# Patient Record
Sex: MALE
[De-identification: names, ages, dates, MRNs, and addresses within clinical notes are randomized per-mention and may not be internally consistent; named-entity substitution may affect disease eponyms.]

---

## 2017-03-12 ENCOUNTER — HOSPITAL ENCOUNTER (INPATIENT)
Dept: HOSPITAL 5 - ED | Age: 27
LOS: 3 days | Discharge: HOME | DRG: 974 | End: 2017-03-15
Attending: INTERNAL MEDICINE | Admitting: INTERNAL MEDICINE
Payer: COMMERCIAL

## 2017-03-12 DIAGNOSIS — M62.82: ICD-10-CM

## 2017-03-12 DIAGNOSIS — T43.625A: ICD-10-CM

## 2017-03-12 DIAGNOSIS — E44.0: ICD-10-CM

## 2017-03-12 DIAGNOSIS — N17.0: ICD-10-CM

## 2017-03-12 DIAGNOSIS — E87.1: ICD-10-CM

## 2017-03-12 DIAGNOSIS — E87.6: ICD-10-CM

## 2017-03-12 DIAGNOSIS — Z71.51: ICD-10-CM

## 2017-03-12 DIAGNOSIS — B20: ICD-10-CM

## 2017-03-12 DIAGNOSIS — G93.41: Primary | ICD-10-CM

## 2017-03-12 DIAGNOSIS — F15.10: ICD-10-CM

## 2017-03-12 LAB
ALBUMIN SERPL-MCNC: 3.5 G/DL (ref 3.9–5)
ALBUMIN/GLOB SERPL: 0.6 %
ALP SERPL-CCNC: 52 UNITS/L (ref 35–129)
ALT SERPL-CCNC: 24 UNITS/L (ref 7–56)
ANION GAP SERPL CALC-SCNC: 24 MMOL/L
ANISOCYTOSIS BLD QL SMEAR: (no result)
APTT BLD: 26.8 SEC. (ref 24.2–36.6)
BILIRUB SERPL-MCNC: 0.8 MG/DL (ref 0.1–1.2)
BLASTOCYTES % (MANUAL): 0 %
BUN SERPL-MCNC: 64 MG/DL (ref 9–20)
BUN/CREAT SERPL: 26.66 %
CALCIUM SERPL-MCNC: 9.3 MG/DL (ref 8.4–10.2)
CHLORIDE SERPL-SCNC: 82.4 MMOL/L (ref 98–107)
CO2 SERPL-SCNC: 27 MMOL/L (ref 22–30)
GLUCOSE SERPL-MCNC: 121 MG/DL (ref 75–100)
HCT VFR BLD CALC: 46.8 % (ref 35.5–45.6)
HGB BLD-MCNC: 16.2 GM/DL (ref 11.8–15.2)
INR PPP: 0.92 (ref 0.87–1.13)
MCH RBC QN AUTO: 33 PG (ref 28–32)
MCHC RBC AUTO-ENTMCNC: 35 % (ref 32–34)
MCV RBC AUTO: 94 FL (ref 84–94)
PLATELET # BLD: 180 K/MM3 (ref 140–440)
POTASSIUM SERPL-SCNC: 3.5 MMOL/L (ref 3.6–5)
PROT SERPL-MCNC: 9.4 G/DL (ref 6.3–8.2)
RBC # BLD AUTO: 4.97 M/MM3 (ref 3.65–5.03)
SODIUM SERPL-SCNC: 130 MMOL/L (ref 137–145)
TOTAL CELLS COUNTED PERCENT: 29
WBC # BLD AUTO: 8.9 K/MM3 (ref 4.5–11)

## 2017-03-12 PROCEDURE — 84439 ASSAY OF FREE THYROXINE: CPT

## 2017-03-12 PROCEDURE — 36415 COLL VENOUS BLD VENIPUNCTURE: CPT

## 2017-03-12 PROCEDURE — 83735 ASSAY OF MAGNESIUM: CPT

## 2017-03-12 PROCEDURE — G0480 DRUG TEST DEF 1-7 CLASSES: HCPCS

## 2017-03-12 PROCEDURE — 80053 COMPREHEN METABOLIC PANEL: CPT

## 2017-03-12 PROCEDURE — 96360 HYDRATION IV INFUSION INIT: CPT

## 2017-03-12 PROCEDURE — 85027 COMPLETE CBC AUTOMATED: CPT

## 2017-03-12 PROCEDURE — 84100 ASSAY OF PHOSPHORUS: CPT

## 2017-03-12 PROCEDURE — 70450 CT HEAD/BRAIN W/O DYE: CPT

## 2017-03-12 PROCEDURE — 80048 BASIC METABOLIC PNL TOTAL CA: CPT

## 2017-03-12 PROCEDURE — 82140 ASSAY OF AMMONIA: CPT

## 2017-03-12 PROCEDURE — 82550 ASSAY OF CK (CPK): CPT

## 2017-03-12 PROCEDURE — 84484 ASSAY OF TROPONIN QUANT: CPT

## 2017-03-12 PROCEDURE — 85025 COMPLETE CBC W/AUTO DIFF WBC: CPT

## 2017-03-12 PROCEDURE — 80320 DRUG SCREEN QUANTALCOHOLS: CPT

## 2017-03-12 PROCEDURE — 84443 ASSAY THYROID STIM HORMONE: CPT

## 2017-03-12 PROCEDURE — 80051 ELECTROLYTE PANEL: CPT

## 2017-03-12 PROCEDURE — 85610 PROTHROMBIN TIME: CPT

## 2017-03-12 PROCEDURE — 85730 THROMBOPLASTIN TIME PARTIAL: CPT

## 2017-03-12 PROCEDURE — 85007 BL SMEAR W/DIFF WBC COUNT: CPT

## 2017-03-12 NOTE — ADMIT CRITERIA FORM
Admission Criteria Documentation: 








               MUSCULOSKELETAL DISEASE GRG





Clinical Indications for Admission to Inpatient Care





                                                            (Place 'X' for any 

and all applicable criteria):





Hospital admission is needed for appropriate care of the patient because of ANY 

ONE of the following:





[ ]I.    Fracture, dislocation, or other musculoskeletal injury requiring 

inpatient care(medical) as indicated 


         by ANY ONE of the following(4)(5)(6)(7)


         [ ]a)  Vertebral fracture requiring observation for instability or 

neurologic compromise (8)


         [ ]b)  Compartment syndrome (proven or cannot be ruled out during 

observation level of care) (9)


         [ ]c)  Limb-threatening injury


         [ ]d)  Major injury requiring inpatient stabilization such as traction 

initiation or external fixation 


                 before internal fixation or closure of complex or open fracture


         [ ]e)  Major injury requiring inpatient treatment after emergency or 

observation level care (as appropriate)


         [ ]f)   Severe pain requiring acute inpatient management


[ ]II.    Newly diagnosed or suspected bone, joint, or orthopedic device 

infection (e.g., osteomyelitis, septic arthritis) 


          needing ANY ONE of the following(1)(2)(3)


          [ ]a)   IV antibiotics that cannot be initiated in other than 

inpatient setting (e.g., patient too unstable or


                   home infusion not available)


          [ ]b)   Device removal or replacement


          [ ]c)   Bone or soft tissue debridement


          [ ]d)   Joint drainage (drain placement or repetitive aspirations)


[ ]III.    Severe rheumatologic disease (e.g., systemic lupus erythematosus, 

rheumatoid arthritis) with complications


          or comorbidities (Also use Optimal Recovery Care Criteria or General 

Recovery Criteria as appropriate on the 


          basis of predominant condition), including ANY ONE of the following(10

)(11)(12)(13)


          [ ]a)  Severe infection (e.g., CNS infection, sepsis) (14)


          [ ]b)  Respiratory complications, including ANY ONE of the following:


                  [ ]i)     Pleural effusion with respiratory compromise      


                  [ ]ii)    Pulmonary hypertension with congestive failure 


                  [ ]iii)   Respiratory failure


                  [ ]iv)   Pulmonary hemorrhage (15)


           [ ]c) Hematologic disease, including ANY ONE of the following:


                 [ ]i)     Coagulopathy with bleeding        


                 [ ]ii)    Thrombosis with hypercoagulable state      


                 [ ]iii)   Thrombotic thrombocytopenic purpura 


           [ ]d) Cerebritis with seizures, psychosis, or other severe 

abnormalities


           [ ]e) Vertebral destruction with monitoring needed for cervical 

myelopathy& possible respiratory compromise


           [ ]f)  Exacerbation that requires inpatient treatment (e.g., 

intravenous immunosuppression) (16)


           [ ]g) Acute renal failure


[ ]IV. Severe vasculitis with complications or comorbidities (Also use Optimal 

Recovery Care Criteria or


        General Recovery Criteria as appropriate on the basis of predominant 

condition), including


        ANY ONE of the following(11)(12)(17)(18)(19)(20)


        [ ]a)  CNS vasculitis with seizures, psychosis, or other severe 

abnormalities (22)


        [ ]b)  Renal failure (16)                                              

                             


        [ ]c)  Pulmonary hemorrhage (15)


        [ ]d)  Cerebral infarction                                             


        [ ]e)  Gastrointestinal ischemia 


        [ ]f)   Gangrene or threatened amputation


        [ ]g)  Exacerbation that requires inpatient treatment (e.g., 

intravenous immunosuppression) (19)(21)


[ ]V.  Severe myopathy as indicated by ANY ONE of the following (28)(29)


        [ ]a)  New onset of airway compromise or inability to swallow


        [ ]b)  Respiratory deterioration with observation needed for impending 

respiratory failure


        [ ]c)  Exacerbation that requires inpatient treatment (e.g., 

intravenous immunosuppression) 


[ ]VI. Severe gout (crystal arthropathy) as indicated by ANY ONE of the 

following (23)(24)


        [ ]a)  Severe pain requiring acute inpatient management


        [ ]b)  Exacerbation that requires inpatient treatment (e.g., 

intravenous treatment) 


[X ]VII.Rhabdomyolysis and ANY ONE of the following (25)(26)(27)


        [ X]a)  Acute renal failure


        [ ]b)  Need for intravenous hydration after emergency or observation 

level care (as appropriate)


        [ ]c)  Inability to maintain oral hydration


        [ ]d)  Change in mental status


        [ ]e)  Electrolyte abnormality that remains after emergency or 

observation level care (as appropriate) 


[ ]VIII Post amputation complication, as indicated by ANY ONE of the following 


         [ ]a) Infection


         [ ]b) Dehiscence


         [ ]c) Myodesis failure        


[ ]IX. Severe pain requiring acute inpatient management as indicated by ALL of 

the following (30)(31)(32)


        [ ]a)  Continuous or frequent (e.g., every 2 to 4 hrs) parenteral 

analgesics required [A]


        [ ]b)  Rapid improvement expected from treatment or acute intervention (

e.g., surgery, anesthesia procedure[B]


[ ]X.  Musculoskeletal Disease and ALL of the following:


        [ ]a)  Symptom or finding for which emergency and observation care have 

failed or are not considered 


                appropriate (Use General Criteria: Observation Care as 

appropriate)


        [ ]b)  Presence of ANY ONE of the following


               [ ]i)   A General Admission Criteria    


               [ ]ii)  A Pediatric General Admission Criteria 











The original [facility] content created by [facility] has been revised. 


The portions of the content which have been revised are identified through the 

use of italic text or in bold, and [facility] 


has neither reviewed nor approved the modified material. All other unmodified 

content is copyright  [facility].





Please see references footnoted in the original [facility] edition 

2016


Admission Criteria Met: Yes

## 2017-03-12 NOTE — EVENT NOTE
Date: 03/12/17





See H/p in reports


AMS-improving


Methamphetamine binge


Rhabdomyolysis


Acute Renal failure


Hyponatremia


Hypokalemia


Hiv/AIDS

## 2017-03-12 NOTE — EMERGENCY DEPARTMENT REPORT
HPI





- General


Chief Complaint: Altered Mental Status


Time Seen by Provider: 03/12/17 14:11





- HPI


HPI: 





 


Chief complaint: Altered mental status after a methamphetamine binge


HPI: Patient was brought in by his mother for not acting well after 

methamphetamine binge.  Last Saturday patient disappeared until Wednesday.  

During that time he was binging on amphetamines.  After returning home patient 

seemed to be normal on Wednesday but on Thursday became confused and then had 

nausea and vomiting.  On Friday patient is not feeling well and was not eating 

or drinking.  Patient seems confused initially but is gradually becoming more 

himself.  Patient is currently alert and oriented 3 and feels fatigued but is 

no longer having nausea vomiting.  Patient is not having any fever or diarrhea 

or cough or cold.


Mode of arrival:   private car


Source: Patient


Began: Approximately one week


Duration: See above


Context: See above.  Patient was admitted here in December and had a low CD4 

count and was treated for cryptosporidium which he has been treated for and he 

is no longer having diarrhea.  Patient states he has been taking his 

medications since December and has been gaining some weight.  Patient was being 

followed by Dr. Gordillo who left the local practice and is now being followed 

by Dr. francis.


Quality: Pain-free


Severity: 0  out of 10


Improved with: Nothing


Worsened with: Nothing


Associated signs and symptoms: See above





ED Past Medical Hx





- Past Medical History


Additional medical history: AIDS





- Social History


Smoking Status: Unknown if ever smoked


Substance Use Type: Methamphetamines





- Medications


Home Medications: 


 Home Medications











 Medication  Instructions  Recorded  Confirmed  Last Taken  Type


 


Elvitegr/Cobicist/Emtric/Tenof 1 each PO DAILY 12/04/16 12/04/16 Unknown History





[Stribild Tablet]     


 


Nitazoxanide [Alinia] 500 mg PO Q12H 12/04/16 12/04/16 Unknown History


 


Azithromycin [Zithromax TAB] 600 mg PO QDAY #4 tablet 12/09/16  Unknown Rx


 


Fluconazole [Diflucan TAB] 100 mg PO QDAY #7 tablet 12/09/16  Unknown Rx


 


Potassium Chloride 20 meq PO DAILY #7 tablet.er 12/09/16  Unknown Rx


 


Sulfamethoxazole/Trimethoprim 1 each PO DAILY #30 tablet 12/09/16  Unknown Rx





[Bactrim DS TAB]     














ED Review of Systems


ROS: 


Stated complaint: SOB/CAN'T TALK


Other details as noted in HPI


ROS





Constitutional: No fever 


ENT: No uri symptoms


Cardiovascular: No chest pain


Respiratory: No sob or cough


GI: No nausea vomiting or diarrhea


: No dysuria frequency or urgency, 


Skin: No rash


Neuro: No focal weakness or numbness


Psych: No depression


Hema/lymph: No edema





Physical Exam





- Physical Exam


Vital Signs: 


 Vital Signs











  03/12/17





  09:47


 


Temperature 98.4 F


 


Pulse Rate 94 H


 


Respiratory 18





Rate 


 


Blood Pressure 132/95


 


O2 Sat by Pulse 100





Oximetry 











Physical Exam: 





GENERAL: The patient is thin -American male in no acute distress.


HEENT: Normocephalic.  Atraumatic.  Extraocular motions are intact.  Patient 

has moist mucous membranes.


NECK: Supple.  No meningitic signs are noted.  There is no adenopathy noted.


CHEST/LUNGS: Clear to auscultation.  There is no respiratory distress noted.


HEART/CARDIOVASCULAR: Regular.  There is no tachycardia.  There is no gallop 

rub or murmur.


ABDOMEN: Abdomen is soft, nontender.  Patient has normal bowel sounds.  There 

is no abdominal distention.


SKIN: There is no rash.  There is no edema.  There is no diaphoresis.


NEURO: The patient is awake, alert, and oriented 3.  The patient is 

cooperative.  The patient has no focal neurologic deficits.  The patient has 

normal speech.


MUSCULOSKELETAL: There is no tenderness or deformity.  There is no limitation 

range of motion.  There is no evidence of acute injury.





ED Course


 Vital Signs











  03/12/17





  09:47


 


Temperature 98.4 F


 


Pulse Rate 94 H


 


Respiratory 18





Rate 


 


Blood Pressure 132/95


 


O2 Sat by Pulse 100





Oximetry 














- Reevaluation(s)


Reevaluation #1: 





03/12/17 14:50


Patient started on 2 L of normal saline for his acute renal failure and will be 

admitted to the hospitalist.





ED Medical Decision Making





- Lab Data


Result diagrams: 


 03/12/17 10:37





 03/12/17 10:37





 Laboratory Tests











  03/12/17 03/12/17 03/12/17





  10:37 10:37 10:37


 


PT  12.3  


 


INR  0.92  


 


APTT  26.8  


 


AST   92 H 


 


Total Creatine Kinase   7771 H 


 


Total Protein   9.4 H 


 


Albumin   3.5 L 


 


TSH    4.520 H


 


Salicylates   


 


Acetaminophen   


 


Plasma/Serum Alcohol   














  03/12/17 03/12/17 03/12/17





  10:37 10:37 10:37


 


PT   


 


INR   


 


APTT   


 


AST   


 


Total Creatine Kinase   


 


Total Protein   


 


Albumin   


 


TSH   


 


Salicylates  < 0.3 L  


 


Acetaminophen   < 15.0 


 


Plasma/Serum Alcohol    < 0.01














- Radiology Data


Radiology results: report reviewed (CT head shows no acute process.)


Critical care attestation.: 


If time is entered above; I have spent that time in minutes in the direct care 

of this critically ill patient, excluding procedure time.








ED Disposition


Clinical Impression: 


 Acute renal failure, Rhabdomyolysis





Disposition: OP ADMITTED AS IP TO THIS HOSP


Is pt being admited?: Yes


Does the pt Need Aspirin: No


Condition: Fair


Referrals: 


PRIMARY CARE,MD [Primary Care Provider] - 3-5 Days


Time of Disposition: 14:50 (admit to the hospitalist)

## 2017-03-12 NOTE — EMERGENCY DEPARTMENT REPORT
Chief Complaint: Altered Mental Status


Stated Complaint: SOB/CAN'T TALK





- HPI


History of Present Illness: 





26-year-old -American male with a past medical history of HIV and drug 

abuse comes in today for being nonverbal.  Mother reports the patient has been 

on a binge and he comes back home.  To her to be dehydrated and is non-verbal.  

Patient denies any pain.





- Exam


Vital Signs: 


 Vital Signs











  03/12/17





  09:47


 


Temperature 98.4 F


 


Pulse Rate 94 H


 


Respiratory 18





Rate 


 


Blood Pressure 132/95


 


O2 Sat by Pulse 100





Oximetry 











Physical Exam: 





GENERAL: Alert and oriented x3, no apparent distress, Normal Gait, atraumatic. 


HEAD: Head is normocephalic and a-traumatic. 


EYES: Extra ocular muscles are intact. Pupils are equal, round, and reactive to 

light and accommodation. 


 


MOUTH:Mouth is not well hydrated and with white thick exudate on tongue. 

Tonsils nonerythematous or swollen, Uvula midline, Tongue not elevated.. 

Posterior pharynx clear, no exudate or lesions. Patent airways. 


NECK: Supple. Non edematous, No lymphadenopathy or thyromegaly. 


LUNGS: Symetrical with respiration, No wheezing, no rales or crackles, CTAB. 


HEART: S1, S2 present, regular rate and rhythm without murmur, no rubs, no 

gallops. 


ABDOMEN: No organomegaly was noted,Positive bowel sounds, soft, and non-

distended. . Nontender to palpation on all Quadrants, NO CVA tenderness. 


NEUROLOGIC: No focal Deficit, Cranial nerves II through XII are grossly intact. 

No loss of sensation, No facial droop, non verbal 


PSYCHIATRIC: Mood is congruent with affect, 


SKIN: Warm and dry, No lesions, No ulceration or induration present





MSE screening note: 


Focused history and physical exam performed.


Due to findings the following was ordered:





Patient's been notified weighted by this provider and MSE.  Patient will be 

evaluated at [facility].  Altered mental status protocol ordered.





ED Disposition for MSE


Condition: Stable

## 2017-03-12 NOTE — CAT SCAN REPORT
FINAL REPORT



PROCEDURE:  CT HEAD/BRAIN WO CON



TECHNIQUE:  Computerized tomography of the head was performed

without contrast material. 



HISTORY:  ams elevated ck and JULIETTE 



COMPARISON:  No prior studies are available for comparison.



FINDINGS:  

Skull and scalp: Normal.



Paranasal sinuses: Trace fluid right mastoid. Mucosal thickening

left posterior maxillary sinus.



Ventricles and subarachnoid spaces: Normal.



Cerebrum: No evidence of hemorrhage, acute infarction or mass .



Cerebellum and brainstem: No evidence of hemorrhage, acute

infarction or mass.



Vasculature: Mildly dense intracranial arteries.



Comments: Cavum septum pellucidum.



IMPRESSION:  

No definite evidence of acute intracranial pathology. If symptoms

and or concern persists recommend MRI.

## 2017-03-13 LAB
ANION GAP SERPL CALC-SCNC: 14 MMOL/L
CHLORIDE SERPL-SCNC: 92 MMOL/L (ref 98–107)
CO2 SERPL-SCNC: 31 MMOL/L (ref 22–30)
MAGNESIUM SERPL-MCNC: 2.8 MG/DL (ref 1.7–2.3)
PHOSPHATE SERPL-MCNC: 2.8 MG/DL (ref 2.5–4.5)
POTASSIUM SERPL-SCNC: 3 MMOL/L (ref 3.6–5)
SODIUM SERPL-SCNC: 134 MMOL/L (ref 137–145)

## 2017-03-13 RX ADMIN — TENOFOVIR DISPROXIL FUMARATE SCH MG: 300 TABLET ORAL at 09:33

## 2017-03-13 RX ADMIN — SODIUM CHLORIDE SCH MLS/HR: 0.9 INJECTION, SOLUTION INTRAVENOUS at 23:25

## 2017-03-13 NOTE — HISTORY AND PHYSICAL REPORT
CHIEF COMPLAINT:

1.  Methamphetamine binge.

2.  Altered mental status.



HISTORY OF PRESENT ILLNESS:  A 26-year-old -American male with history of

HIV and AIDS, brought in by mother for altered mental status.  The patient was

on methamphetamine binge 1 week ago last Saturday, this appeared until

Wednesday.  During that time, the patient was apparently binging on

methamphetamines.  The patient was apparently normal on Wednesday, which is

03/08/2017, by around 03/09/2017, the patient became confused, and also had

nausea and vomiting.  On 03/10/2017, the patient was not feeling well and was

not eating or drinking.  The patient seems confused initially, but has been

gradually becoming more himself.  The patient is currently better now compared

to 3 days ago, but feels fatigued and no longer having nausea and vomiting, and

not having any fever or chills.  The patient was admitted here in December for

low CD4 count and was treated for cryptosporidium, was followed by Dr. Ruano

and Dr. Eng.  The patient currently under antiretrovirals.



PAST MEDICAL HISTORY:  Significant for AIDS and HIV.



SOCIAL HISTORY:  Methamphetamine use.



PAST SURGICAL HISTORY:  None.



FAMILY HISTORY:  Hypertension.



CURRENT MEDICATIONS:  Stribild tablets 1 tablet once a day, _____ 500 mg p.o.

q.i.d., Zithromax 600 mg q.i.d., fluconazole 100 mg p.o. daily, potassium

chloride 20 mEq daily, Bactrim 1 tablet p.o. daily.



REVIEW OF SYSTEMS:

CONSTITUTIONAL:  Altered sensorium 3 days ago, but now better, back to baseline.

 There was also vomiting or nausea.

HEENT:  No sore throat.  No postnasal drip.

CARDIOVASCULAR AND RESPIRATORY SYSTEM:  No shortness of breath, no chest pain,

no palpitations.

GASTROINTESTINAL:  No nausea, vomiting present until two days ago, today is

feeling better.

GENITOURINARY SYSTEM:  No dysuria, no flank pain.

MUSCULOSKELETAL SYSTEM:  No joint pains.  No muscle pains.

CENTRAL NERVOUS SYSTEM:  No syncope, no seizures.  Altered sensorium until two

days ago.

SKIN:  Normal.

A 14-point review of systems done and are negative.



PHYSICAL EXAMINATION:

GENERAL:  Young male, cooperative during examination.

VITAL SIGNS:  Temperature 98.4, pulse is 94, respiratory rate is 18, blood

pressure is 132/95, sats are 100%.

HEENT:  Unremarkable.  Tongue dry.

NECK:  Supple, no lymphadenopathy, no thyromegaly.

LUNGS:  Clear to auscultation and percussion.  Good air entry.

CARDIOVASCULAR:  S1, S2 heard.  No gallop, no murmur, no rub.  Apical impulse in

left fifth intercostal space and midclavicular line.

ABDOMEN:  Soft and benign.  No hepatosplenomegaly.  No guarding and no rigidity.

 Hernial orifices are normal.

EXTREMITIES:  Good pedal pulses.  No pedal edema.

CENTRAL NERVOUS SYSTEM:  Alert and oriented x4.

NEUROLOGIC:  Nonfocal exam.

SKIN:  Normal.



LABORATORY DATA:  Significant for H and H of 16.2 and 46.8 high.  Platelet count

is 180,000.  Sodium is 130, potassium is 3.5, BUN and creatinine 64 and 2.4,

creatinine kinase is 7771.  TSH is 4.52, total protein is 9.4, albumin 3.5, AST

is high at 92; toxicology screen is negative.  Amphetamine positive.  Head CT

was normal.



ASSESSMENT AND PLAN:

1.  Acute renal failure.  The patient was started on IV fluids.  Follow up BUN

and creatinine, Dr. Trevino, the Nephrology on-call was consulted.

2.  Rhabdomyolysis, moderate.  IV fluids for the time being.  Follow BUN and

creatinine.

3.  Methamphetamine danger.  Patient is on prolonged CIWA protocol.  The patient

is more alert and oriented x3 days ago.

4.  HIV/AIDS, continue antiretrovirals.

5.  Hyponatremia, should correct with IV fluids.  Her urine electrolytes

ordered.

6.  Hypokalemia, supplemented.

7.  Malnutrition moderate.  Dietary consult ordered.

8.  Transaminitis, ALT is normal.  AST is high.

9.  Deep venous thrombosis prophylaxis, Lovenox 40 mg subcutaneous daily.





DD: 03/13/2017 01:37

DT: 03/13/2017 02:26

JOB# [id number]  [id number]

VSM/NTS

## 2017-03-13 NOTE — PROGRESS NOTE
Assessment and Plan


Assessment and plan: 


Patient is a 26-year-old -American male with history of HIV/AIDS, 

Admitted with altered mental status after a week of methamphetamine binge and I'

ll return home had multiple episodes of nausea with vomiting.  History shows 

that he has had treatment for Cryptosporidium in December 2016 with noted low 

CD4 count of the time, he reports that he is on the care of and infectious 

diseases physician at this time..  On admission was noted to have 

rhabdomyolysis.








* Acute none traumatic rhabdomyolysis


* Hyponatremia


* Hypokalemia


* Substance abuse-amphetamine


* Acute kidney injury-likely secondary to visible today nephropathy present on 

admission


* AIDS





Plan


* Continue aggressive fluid resuscitation


* Monitor creatinine kinase


* Nephrology input noted and will continue to monitor renal function


* Continue HAART therapy


* Counselling provided to the patient on need to avoid substance abuse.  

Patient verbalized understanding


* DVT/GI PROPHY








History


Interval history: 


Altered mental status


Patient seen and examined this morning in no acute distress


Denies any chest pain, nausea, vomiting, diarrhea


No fever noted blood pressure controlled


No adverse events reported to me by nursing staff








Hospitalist Physical





- Physical exam


Narrative exam: 


 VITAL SIGNS:  Reviewed.    


GENERAL:  The patient appeared well nourished and normally developed. Vital 

signs as documented.


HEAD:  No signs of head trauma.


EYES:  Pupils are equal.  Extraocular motions intact.  


EARS:  Hearing grossly intact.


MOUTH:  Oropharynx is normal. 


NECK:  No adenopathy, no JVD.   


CHEST:  Chest with clear breath sounds bilaterally.  No wheezes, rales, or 

rhonchi.  


CARDIAC:  Regular rate and rhythm.  S1 and S2, without murmurs, gallops, or 

rubs.


VASCULAR:  No Edema.  Peripheral pulses normal and equal in all extremities.


ABDOMEN:  Soft, without detectable tenderness.  No sign of distention.  No   

rebound or guarding, and no masses palpated.   Bowel Sounds normal.


MUSCULOSKELETAL:  Good range of motion of all major joints. Extremities without 

clubbing, cyanosis or edema.  


NEUROLOGIC EXAM:  Alert and oriented x 3.  Still lethargic.  No focal sensory 

or strength deficits.   Speech still slow.  Follows commands.


PSYCHIATRIC:  Mood normal.


SKIN:  No rash or lesions.














- Constitutional


Vitals: 


 











Temp Pulse Resp BP Pulse Ox


 


 98.5 F   86   16   133/72   100 


 


 03/13/17 08:25  03/13/17 08:25  03/13/17 08:25  03/13/17 08:25  03/13/17 08:25














Results





- Labs


CBC & Chem 7: 


 03/12/17 10:37





 03/13/17 04:06


Labs: 


 Laboratory Last Values











WBC  8.9 K/mm3 (4.5-11.0)   03/12/17  10:37    


 


RBC  4.97 M/mm3 (3.65-5.03)   03/12/17  10:37    


 


Hgb  16.2 gm/dl (11.8-15.2)  H  03/12/17  10:37    


 


Hct  46.8 % (35.5-45.6)  H  03/12/17  10:37    


 


MCV  94 fl (84-94)   03/12/17  10:37    


 


MCH  33 pg (28-32)  H  03/12/17  10:37    


 


MCHC  35 % (32-34)  H  03/12/17  10:37    


 


RDW  12.3 % (13.2-15.2)  L  03/12/17  10:37    


 


Plt Count  180 K/mm3 (140-440)   03/12/17  10:37    


 


Mono % (Auto)  Np   03/12/17  10:37    


 


Add Manual Diff  Complete   03/12/17  10:37    


 


Total Counted  100   03/12/17  10:37    


 


Seg Neuts % (Manual)  58.0 % (40.0-70.0)   03/12/17  10:37    


 


Band Neutrophils %  0 %  03/12/17  10:37    


 


Lymphocytes % (Manual)  13.0 % (13.4-35.0)  L  03/12/17  10:37    


 


Reactive Lymphs % (Man)  0 %  03/12/17  10:37    


 


Monocytes % (Manual)  27.0 % (0.0-7.3)  H  03/12/17  10:37    


 


Eosinophils % (Manual)  2.0 % (0.0-4.3)   03/12/17  10:37    


 


Basophils % (Manual)  0 % (0.0-1.8)   03/12/17  10:37    


 


Metamyelocytes %  0 %  03/12/17  10:37    


 


Myelocytes %  0 %  03/12/17  10:37    


 


Promyelocytes %  0 %  03/12/17  10:37    


 


Blast Cells %  0 %  03/12/17  10:37    


 


Nucleated RBC %  Not Reportable   03/12/17  10:37    


 


Seg Neutrophils # Man  5.2 K/mm3 (1.8-7.7)   03/12/17  10:37    


 


Band Neutrophils #  0.0 K/mm3  03/12/17  10:37    


 


Lymphocytes # (Manual)  1.2 K/mm3 (1.2-5.4)   03/12/17  10:37    


 


Abs React Lymphs (Man)  0.0 K/mm3  03/12/17  10:37    


 


Monocytes # (Manual)  2.4 K/mm3 (0.0-0.8)  H  03/12/17  10:37    


 


Eosinophils # (Manual)  0.2 K/mm3 (0.0-0.4)   03/12/17  10:37    


 


Basophils # (Manual)  0.0 K/mm3 (0.0-0.1)   03/12/17  10:37    


 


Metamyelocytes #  0.0 K/mm3  03/12/17  10:37    


 


Myelocytes #  0.0 K/mm3  03/12/17  10:37    


 


Promyelocytes #  0.0 K/mm3  03/12/17  10:37    


 


Blast Cells #  0.0 K/mm3  03/12/17  10:37    


 


WBC Morphology  Not Reportable   03/12/17  10:37    


 


Hypersegmented Neuts  Not Reportable   03/12/17  10:37    


 


Hyposegmented Neuts  Not Reportable   03/12/17  10:37    


 


Hypogranular Neuts  Not Reportable   03/12/17  10:37    


 


Smudge Cells  Not Reportable   03/12/17  10:37    


 


Toxic Granulation  Not Reportable   03/12/17  10:37    


 


Toxic Vacuolation  Not Reportable   03/12/17  10:37    


 


Dohle Bodies  Not Reportable   03/12/17  10:37    


 


Pelger-Huet Anomaly  Not Reportable   03/12/17  10:37    


 


Carmine Rods  Not Reportable   03/12/17  10:37    


 


Platelet Estimate  Consistent w auto   03/12/17  10:37    


 


Clumped Platelets  Not Reportable   03/12/17  10:37    


 


Plt Clumps, EDTA  Not Reportable   03/12/17  10:37    


 


Large Platelets  Not Reportable   03/12/17  10:37    


 


Giant Platelets  Not Reportable   03/12/17  10:37    


 


Platelet Satelliting  Not Reportable   03/12/17  10:37    


 


Plt Morphology Comment  Not Reportable   03/12/17  10:37    


 


RBC Morphology  Not Reportable   03/12/17  10:37    


 


Dimorphic RBCs  Not Reportable   03/12/17  10:37    


 


Polychromasia  Not Reportable   03/12/17  10:37    


 


Hypochromasia  Not Reportable   03/12/17  10:37    


 


Poikilocytosis  Not Reportable   03/12/17  10:37    


 


Anisocytosis  1+   03/12/17  10:37    


 


Microcytosis  Not Reportable   03/12/17  10:37    


 


Macrocytosis  Not Reportable   03/12/17  10:37    


 


Spherocytes  Not Reportable   03/12/17  10:37    


 


Pappenheimer Bodies  Not Reportable   03/12/17  10:37    


 


Sickle Cells  Not Reportable   03/12/17  10:37    


 


Target Cells  Not Reportable   03/12/17  10:37    


 


Tear Drop Cells  Not Reportable   03/12/17  10:37    


 


Ovalocytes  Not Reportable   03/12/17  10:37    


 


Helmet Cells  Not Reportable   03/12/17  10:37    


 


[place] Bodies  Not Reportable   03/12/17  10:37    


 


Cabot Rings  Not Reportable   03/12/17  10:37    


 


[place] Cells  Not Reportable   03/12/17  10:37    


 


Bite Cells  Not Reportable   03/12/17  10:37    


 


Crenated Cell  Not Reportable   03/12/17  10:37    


 


Elliptocytes  Not Reportable   03/12/17  10:37    


 


Acanthocytes (Spur)  Not Reportable   03/12/17  10:37    


 


Rouleaux  Not Reportable   03/12/17  10:37    


 


Hemoglobin C Crystals  Not Reportable   03/12/17  10:37    


 


Schistocytes  Not Reportable   03/12/17  10:37    


 


Malaria parasites  Not Reportable   03/12/17  10:37    


 


Agusto Bodies  Not Reportable   03/12/17  10:37    


 


Hem Pathologist Commnt  No   03/12/17  10:37    


 


PT  12.3 Sec. (12.2-14.9)   03/12/17  10:37    


 


INR  0.92  (0.87-1.13)   03/12/17  10:37    


 


APTT  26.8 Sec. (24.2-36.6)   03/12/17  10:37    


 


Sodium  134 mmol/L (137-145)  L  03/13/17  04:06    


 


Potassium  3.0 mmol/L (3.6-5.0)  L  03/13/17  04:06    


 


Chloride  92.0 mmol/L ()  L  03/13/17  04:06    


 


Carbon Dioxide  31 mmol/L (22-30)  H  03/13/17  04:06    


 


Anion Gap  14 mmol/L  03/13/17  04:06    


 


BUN  64 mg/dL (9-20)  H  03/12/17  10:37    


 


Creatinine  2.4 mg/dL (0.8-1.5)  H  03/12/17  10:37    


 


Estimated GFR  33 ml/min  03/12/17  10:37    


 


BUN/Creatinine Ratio  26.66 %  03/12/17  10:37    


 


Glucose  121 mg/dL ()  H  03/12/17  10:37    


 


Lactic Acid  1.5 mmol/L (0.7-2.0)   03/12/17  10:37    


 


Calcium  9.3 mg/dL (8.4-10.2)   03/12/17  10:37    


 


Phosphorus  2.8 mg/dL (2.5-4.5)   03/13/17  04:06    


 


Magnesium  2.8 mg/dL (1.7-2.3)  H  03/13/17  04:06    


 


Total Bilirubin  0.8 mg/dL (0.1-1.2)   03/12/17  10:37    


 


AST  92 units/L (5-40)  H  03/12/17  10:37    


 


ALT  24 units/L (7-56)   03/12/17  10:37    


 


Alkaline Phosphatase  52 units/L ()   03/12/17  10:37    


 


Ammonia  35.0 umol/L (25-60)   03/12/17  10:37    


 


Total Creatine Kinase  8965 units/L ()  H  03/13/17  08:26    


 


Troponin T  0.010 ng/mL (0.00-0.029)   03/12/17  12:59    


 


Total Protein  9.4 g/dL (6.3-8.2)  H  03/12/17  10:37    


 


Albumin  3.5 g/dL (3.9-5)  L  03/12/17  10:37    


 


Albumin/Globulin Ratio  0.6 %  03/12/17  10:37    


 


TSH  4.520 mlU/mL (0.270-4.200)  H  03/12/17  10:37    


 


Free T4  1.41 ng/dL (0.76-1.46)   03/13/17  08:26    


 


Salicylates  < 0.3 mg/dL (2.8-20.0)  L  03/12/17  10:37    


 


Acetaminophen  < 15.0 ug/mL (10.0-30.0)   03/12/17  10:37    


 


Plasma/Serum Alcohol  < 0.01 gm% (0-0.07)   03/12/17  10:37    














- Imaging and Cardiology


CT Scan - head: image reviewed (no acute pathology)

## 2017-03-13 NOTE — CONSULTATION
History of Present Illness





- Reason for Consult


Consult date: 03/13/17


acute renal failure


Requesting physician: OSWALDO HARMNA





- History of Present Illness





26-year-old male with a history of AIDS





Medications and Allergies


 Allergies











Allergy/AdvReac Type Severity Reaction Status Date / Time


 


No Known Allergies Allergy   Unverified 12/04/16 14:17











 Home Medications











 Medication  Instructions  Recorded  Confirmed  Last Taken  Type


 


Darunavir/Cobicistat [Prezcobix 1 each PO DAILY 03/12/17 03/12/17 Unknown 

History





800 mg-150 mg Tablet]     


 


Dolutegravir Sodium [Tivicay] 50 mg PO DAILY 03/12/17 03/12/17 Unknown History


 


Tenofovir [Viread] 300 mg PO QDAY 03/12/17 03/12/17 Unknown History











Active Meds: 


Active Medications





Haloperidol Lactate (Haldol)  5 mg IM Q1H PRN


   PRN Reason: Unrespon. to mult. doses BZD's


Dextrose/Sodium Chloride (D5ns)  1,000 mls @ 125 mls/hr IV AS DIRECT [facility]


   Last Admin: 03/13/17 03:53 Dose:  125 mls/hr


Lorazepam (Ativan)  2 mg IV Q1H PRN


   PRN Reason: CIWA-Ar 8-15


Lorazepam (Ativan)  4 mg IV Q1H PRN


   PRN Reason: CIWA-Ar 16-25


Miscellaneous Medication (Dolutegravir)  50 mg PO DAILY [facility]


   Last Admin: 03/13/17 09:34 Dose:  50 mg


Miscellaneous Medication (Darunavir/Cobicistat [Prezcobix 800 Mg-150 Mg Tablet]

)  1 each PO DAILY [facility]


Tenofovir Disoproxil Fumarate (Viread)  300 mg PO QDAY [facility]


   Last Admin: 03/13/17 09:33 Dose:  300 mg











Exam





- Vital Signs


Vital signs: 


 Vital Signs











Temp Pulse Resp BP Pulse Ox


 


 98.4 F   94 H  18   132/95   100 


 


 03/12/17 09:47  03/12/17 09:47  03/12/17 09:47  03/12/17 09:47  03/12/17 09:47














Results





- Lab Results





 03/12/17 10:37





 03/13/17 04:06


 Most recent lab results











Calcium  9.3 mg/dL (8.4-10.2)   03/12/17  10:37    


 


Phosphorus  2.8 mg/dL (2.5-4.5)   03/13/17  04:06    


 


Magnesium  2.8 mg/dL (1.7-2.3)  H  03/13/17  04:06    














Assessment and Plan





- Patient Problems


(1) Hyponatremia


Current Visit: Yes   Status: Acute   





(2) Acute renal failure


Current Visit: Yes   Status: Acute   


Qualifiers: 


   Acute renal failure type: A 





(3) Rhabdomyolysis


Current Visit: Yes   Status: Acute   


Qualifiers: 


   Rhabdomyolysis type: R   Encounter type: E 





(4) AIDS


Current Visit: No   Status: Acute   





(5) Hypokalemia


Current Visit: No   Status: Acute

## 2017-03-14 LAB
ANION GAP SERPL CALC-SCNC: 15 MMOL/L
BUN SERPL-MCNC: 17 MG/DL (ref 9–20)
BUN/CREAT SERPL: 12.14 %
CALCIUM SERPL-MCNC: 8 MG/DL (ref 8.4–10.2)
CHLORIDE SERPL-SCNC: 101.5 MMOL/L (ref 98–107)
CO2 SERPL-SCNC: 25 MMOL/L (ref 22–30)
GLUCOSE SERPL-MCNC: 104 MG/DL (ref 75–100)
HCT VFR BLD CALC: 36.4 % (ref 35.5–45.6)
HGB BLD-MCNC: 12.5 GM/DL (ref 11.8–15.2)
MCH RBC QN AUTO: 32 PG (ref 28–32)
MCHC RBC AUTO-ENTMCNC: 34 % (ref 32–34)
MCV RBC AUTO: 94 FL (ref 84–94)
PLATELET # BLD: 205 K/MM3 (ref 140–440)
POTASSIUM SERPL-SCNC: 3.1 MMOL/L (ref 3.6–5)
RBC # BLD AUTO: 3.89 M/MM3 (ref 3.65–5.03)
SODIUM SERPL-SCNC: 138 MMOL/L (ref 137–145)
WBC # BLD AUTO: 8.2 K/MM3 (ref 4.5–11)

## 2017-03-14 RX ADMIN — SODIUM CHLORIDE SCH MLS/HR: 0.9 INJECTION, SOLUTION INTRAVENOUS at 04:12

## 2017-03-14 RX ADMIN — POTASSIUM CHLORIDE SCH MEQ: 1500 TABLET, EXTENDED RELEASE ORAL at 11:47

## 2017-03-14 RX ADMIN — TENOFOVIR DISPROXIL FUMARATE SCH MG: 300 TABLET ORAL at 09:42

## 2017-03-14 RX ADMIN — SODIUM CHLORIDE SCH MLS/HR: 0.9 INJECTION, SOLUTION INTRAVENOUS at 22:55

## 2017-03-14 RX ADMIN — POTASSIUM CHLORIDE SCH MEQ: 1500 TABLET, EXTENDED RELEASE ORAL at 09:42

## 2017-03-14 RX ADMIN — SODIUM CHLORIDE SCH MLS/HR: 0.9 INJECTION, SOLUTION INTRAVENOUS at 08:01

## 2017-03-14 NOTE — PROGRESS NOTE
Assessment and Plan


Assessment and plan: 


Patient is a 26-year-old -American male with history of HIV/AIDS, 

Admitted with altered mental status after a week of methamphetamine binge and I'

ll return home had multiple episodes of nausea with vomiting.  History shows 

that he has had treatment for Cryptosporidium in December 2016 with noted low 

CD4 count of the time, he reports that he is on the care of and infectious 

diseases physician at this time..  On admission was noted to have 

rhabdomyolysis.








* Acute none traumatic rhabdomyolysis


* Hyponatremia


* Hypokalemia


* Substance abuse-amphetamine


* Acute kidney injury-likely secondary to visible today nephropathy present on 

admission


* AIDS





Plan


* Renal function has improved CPK still elevated will continue fluids recheck 

CPK later today.  If improved will discharge patient's otherwise will await 

a.m. reports.  


* Monitor creatinine kinase


* Nephrology input noted 


* Continue HAART therapy


* Counselling provided to the patient on need to avoid substance abuse.  

Patient verbalized understanding


* DVT/GI PROPHY








History


Interval history: 


Altered mental status


Patient seen and examined this morning in no acute distress.  Denies any 

urinary abnormality


Denies any chest pain, nausea, vomiting, diarrhea


No fever noted blood pressure controlled


No adverse events reported to me by nursing staff








Hospitalist Physical





- Physical exam


Narrative exam: 


 VITAL SIGNS:  Reviewed.    


GENERAL:  The patient appeared well nourished and normally developed. Vital 

signs as documented.


HEAD:  No signs of head trauma.


EYES:  Pupils are equal.  Extraocular motions intact.  


EARS:  Hearing grossly intact.


MOUTH:  Oropharynx is normal. 


NECK:  No adenopathy, no JVD.   


CHEST:  Chest with clear breath sounds bilaterally.  No wheezes, rales, or 

rhonchi.  


CARDIAC:  Regular rate and rhythm.  S1 and S2, without murmurs, gallops, or 

rubs.


VASCULAR:  No Edema.  Peripheral pulses normal and equal in all extremities.


ABDOMEN:  Soft, without detectable tenderness.  No sign of distention.  No   

rebound or guarding, and no masses palpated.   Bowel Sounds normal.


MUSCULOSKELETAL:  Good range of motion of all major joints. Extremities without 

clubbing, cyanosis or edema.  


NEUROLOGIC EXAM:  Alert and oriented x 3.  Still lethargic.  No focal sensory 

or strength deficits.   Speech improved.  Follows commands.


PSYCHIATRIC:  Mood normal.


SKIN:  No rash or lesions.














- Constitutional


Vitals: 


 











Temp Pulse Resp BP Pulse Ox


 


 98.4 F   78   16   126/70   98 


 


 03/14/17 08:00  03/14/17 08:00  03/14/17 08:00  03/14/17 08:00  03/14/17 08:00














Results





- Labs


CBC & Chem 7: 


 03/14/17 05:44





 03/14/17 05:44


Labs: 


 Laboratory Last Values











WBC  8.2 K/mm3 (4.5-11.0)   03/14/17  05:44    


 


RBC  3.89 M/mm3 (3.65-5.03)   03/14/17  05:44    


 


Hgb  12.5 gm/dl (11.8-15.2)  D 03/14/17  05:44    


 


Hct  36.4 % (35.5-45.6)  D 03/14/17  05:44    


 


MCV  94 fl (84-94)   03/14/17  05:44    


 


MCH  32 pg (28-32)   03/14/17  05:44    


 


MCHC  34 % (32-34)   03/14/17  05:44    


 


RDW  12.2 % (13.2-15.2)  L  03/14/17  05:44    


 


Plt Count  205 K/mm3 (140-440)   03/14/17  05:44    


 


Mono % (Auto)  Np   03/12/17  10:37    


 


Add Manual Diff  Complete   03/12/17  10:37    


 


Total Counted  100   03/12/17  10:37    


 


Seg Neuts % (Manual)  58.0 % (40.0-70.0)   03/12/17  10:37    


 


Band Neutrophils %  0 %  03/12/17  10:37    


 


Lymphocytes % (Manual)  13.0 % (13.4-35.0)  L  03/12/17  10:37    


 


Reactive Lymphs % (Man)  0 %  03/12/17  10:37    


 


Monocytes % (Manual)  27.0 % (0.0-7.3)  H  03/12/17  10:37    


 


Eosinophils % (Manual)  2.0 % (0.0-4.3)   03/12/17  10:37    


 


Basophils % (Manual)  0 % (0.0-1.8)   03/12/17  10:37    


 


Metamyelocytes %  0 %  03/12/17  10:37    


 


Myelocytes %  0 %  03/12/17  10:37    


 


Promyelocytes %  0 %  03/12/17  10:37    


 


Blast Cells %  0 %  03/12/17  10:37    


 


Nucleated RBC %  Not Reportable   03/12/17  10:37    


 


Seg Neutrophils # Man  5.2 K/mm3 (1.8-7.7)   03/12/17  10:37    


 


Band Neutrophils #  0.0 K/mm3  03/12/17  10:37    


 


Lymphocytes # (Manual)  1.2 K/mm3 (1.2-5.4)   03/12/17  10:37    


 


Abs React Lymphs (Man)  0.0 K/mm3  03/12/17  10:37    


 


Monocytes # (Manual)  2.4 K/mm3 (0.0-0.8)  H  03/12/17  10:37    


 


Eosinophils # (Manual)  0.2 K/mm3 (0.0-0.4)   03/12/17  10:37    


 


Basophils # (Manual)  0.0 K/mm3 (0.0-0.1)   03/12/17  10:37    


 


Metamyelocytes #  0.0 K/mm3  03/12/17  10:37    


 


Myelocytes #  0.0 K/mm3  03/12/17  10:37    


 


Promyelocytes #  0.0 K/mm3  03/12/17  10:37    


 


Blast Cells #  0.0 K/mm3  03/12/17  10:37    


 


WBC Morphology  Not Reportable   03/12/17  10:37    


 


Hypersegmented Neuts  Not Reportable   03/12/17  10:37    


 


Hyposegmented Neuts  Not Reportable   03/12/17  10:37    


 


Hypogranular Neuts  Not Reportable   03/12/17  10:37    


 


Smudge Cells  Not Reportable   03/12/17  10:37    


 


Toxic Granulation  Not Reportable   03/12/17  10:37    


 


Toxic Vacuolation  Not Reportable   03/12/17  10:37    


 


Dohle Bodies  Not Reportable   03/12/17  10:37    


 


Pelger-Huet Anomaly  Not Reportable   03/12/17  10:37    


 


Carmine Rods  Not Reportable   03/12/17  10:37    


 


Platelet Estimate  Consistent w auto   03/12/17  10:37    


 


Clumped Platelets  Not Reportable   03/12/17  10:37    


 


Plt Clumps, EDTA  Not Reportable   03/12/17  10:37    


 


Large Platelets  Not Reportable   03/12/17  10:37    


 


Giant Platelets  Not Reportable   03/12/17  10:37    


 


Platelet Satelliting  Not Reportable   03/12/17  10:37    


 


Plt Morphology Comment  Not Reportable   03/12/17  10:37    


 


RBC Morphology  Not Reportable   03/12/17  10:37    


 


Dimorphic RBCs  Not Reportable   03/12/17  10:37    


 


Polychromasia  Not Reportable   03/12/17  10:37    


 


Hypochromasia  Not Reportable   03/12/17  10:37    


 


Poikilocytosis  Not Reportable   03/12/17  10:37    


 


Anisocytosis  1+   03/12/17  10:37    


 


Microcytosis  Not Reportable   03/12/17  10:37    


 


Macrocytosis  Not Reportable   03/12/17  10:37    


 


Spherocytes  Not Reportable   03/12/17  10:37    


 


Pappenheimer Bodies  Not Reportable   03/12/17  10:37    


 


Sickle Cells  Not Reportable   03/12/17  10:37    


 


Target Cells  Not Reportable   03/12/17  10:37    


 


Tear Drop Cells  Not Reportable   03/12/17  10:37    


 


Ovalocytes  Not Reportable   03/12/17  10:37    


 


Helmet Cells  Not Reportable   03/12/17  10:37    


 


[place] Bodies  Not Reportable   03/12/17  10:37    


 


Cabot Rings  Not Reportable   03/12/17  10:37    


 


[place] Cells  Not Reportable   03/12/17  10:37    


 


Bite Cells  Not Reportable   03/12/17  10:37    


 


Crenated Cell  Not Reportable   03/12/17  10:37    


 


Elliptocytes  Not Reportable   03/12/17  10:37    


 


Acanthocytes (Spur)  Not Reportable   03/12/17  10:37    


 


Rouleaux  Not Reportable   03/12/17  10:37    


 


Hemoglobin C Crystals  Not Reportable   03/12/17  10:37    


 


Schistocytes  Not Reportable   03/12/17  10:37    


 


Malaria parasites  Not Reportable   03/12/17  10:37    


 


Agusto Bodies  Not Reportable   03/12/17  10:37    


 


Hem Pathologist Commnt  No   03/12/17  10:37    


 


PT  12.3 Sec. (12.2-14.9)   03/12/17  10:37    


 


INR  0.92  (0.87-1.13)   03/12/17  10:37    


 


APTT  26.8 Sec. (24.2-36.6)   03/12/17  10:37    


 


Sodium  138 mmol/L (137-145)   03/14/17  05:44    


 


Potassium  3.1 mmol/L (3.6-5.0)  L  03/14/17  05:44    


 


Chloride  101.5 mmol/L ()   03/14/17  05:44    


 


Carbon Dioxide  25 mmol/L (22-30)   03/14/17  05:44    


 


Anion Gap  15 mmol/L  03/14/17  05:44    


 


BUN  17 mg/dL (9-20)   03/14/17  05:44    


 


Creatinine  1.4 mg/dL (0.8-1.5)   03/14/17  05:44    


 


Estimated GFR  > 60 ml/min  03/14/17  05:44    


 


BUN/Creatinine Ratio  12.14 %  03/14/17  05:44    


 


Glucose  104 mg/dL ()  H  03/14/17  05:44    


 


Lactic Acid  1.5 mmol/L (0.7-2.0)   03/12/17  10:37    


 


Calcium  8.0 mg/dL (8.4-10.2)  L  03/14/17  05:44    


 


Phosphorus  2.8 mg/dL (2.5-4.5)   03/13/17  04:06    


 


Magnesium  2.8 mg/dL (1.7-2.3)  H  03/13/17  04:06    


 


Total Bilirubin  0.8 mg/dL (0.1-1.2)   03/12/17  10:37    


 


AST  92 units/L (5-40)  H  03/12/17  10:37    


 


ALT  24 units/L (7-56)   03/12/17  10:37    


 


Alkaline Phosphatase  52 units/L ()   03/12/17  10:37    


 


Ammonia  35.0 umol/L (25-60)   03/12/17  10:37    


 


Total Creatine Kinase  8551 units/L ()  H  03/14/17  05:44    


 


Troponin T  0.010 ng/mL (0.00-0.029)   03/12/17  12:59    


 


Total Protein  9.4 g/dL (6.3-8.2)  H  03/12/17  10:37    


 


Albumin  3.5 g/dL (3.9-5)  L  03/12/17  10:37    


 


Albumin/Globulin Ratio  0.6 %  03/12/17  10:37    


 


TSH  4.520 mlU/mL (0.270-4.200)  H  03/12/17  10:37    


 


Free T4  1.41 ng/dL (0.76-1.46)   03/13/17  08:26    


 


Salicylates  < 0.3 mg/dL (2.8-20.0)  L  03/12/17  10:37    


 


Acetaminophen  < 15.0 ug/mL (10.0-30.0)   03/12/17  10:37    


 


Plasma/Serum Alcohol  < 0.01 gm% (0-0.07)   03/12/17  10:37

## 2017-03-14 NOTE — PROGRESS NOTE
Assessment and Plan





(1) Hyponatremia


Current Visit: Yes   Status: Acute   


improved with IVF, continue to monitor





(2) Acute renal failure


Current Visit: Yes   Status: Acute   


Qualifiers: 


   Acute renal failure type: A 


Improving, Cr 1.4 today, continue with IVF


Monitor renal fxn and electrolytes


Avoid nephrotoxins





(3) Rhabdomyolysis


Current Visit: Yes   Status: Acute   


Qualifiers: 


   Rhabdomyolysis type: R   Encounter type: E 


CK increased to 10,395, continue with IVF at 150 ml/hr, recheck labs tomorrow





(4) AIDS


Current Visit: No   Status: Acute  


deffered to ID  





(5) Hypokalemia


Current Visit: No   Status: Acute  


Marginally improved, replete K  








Subjective


Date of service: 03/14/17


Interval history: 





Pt seen lying in bed, NAD


reports nausea this am after breakfast





Objective





- Vital Signs


Vital signs: 


 Vital Signs - 12hr











  03/14/17





  08:00


 


Temperature 98.4 F


 


Pulse Rate [ 78





Right Radial] 


 


Respiratory 16





Rate 


 


Blood Pressure 126/70





[Right Arm] 


 


O2 Sat by Pulse 98





Oximetry 














- General Appearance


General appearance: well-developed, appears stated age


EENT: PERRL, mucous membranes moist, hearing intact


Neck: no JVD, no carotid bruit, supple


Respiratory: Present: Clear to Ascultation, Normal Exam


Cardiology: regular, normal heart rate, S1S2, no murmurs


Gastrointestinal: normal, normoactive bowel sounds


Integumentary: no rash, warm and dry


Neurologic: alert and oriented x3


Musculoskeletal: other (moves all extremitiex)


Psychiatric: cooperative





- Lab





 03/14/17 05:44





 03/14/17 05:44


 Most recent lab results











Calcium  8.0 mg/dL (8.4-10.2)  L  03/14/17  05:44    


 


Phosphorus  2.8 mg/dL (2.5-4.5)   03/13/17  04:06    


 


Magnesium  2.8 mg/dL (1.7-2.3)  H  03/13/17  04:06

## 2017-03-14 NOTE — EVENT NOTE
Date: 03/14/17





Patient seen and examined.  I reviewed above FNP notes which I agree with.  

Assessment and Plan discussed earlier. Continue volume repletion. Follow up CPK

## 2017-03-15 VITALS — DIASTOLIC BLOOD PRESSURE: 67 MMHG | SYSTOLIC BLOOD PRESSURE: 121 MMHG

## 2017-03-15 RX ADMIN — SODIUM CHLORIDE SCH MLS/HR: 0.9 INJECTION, SOLUTION INTRAVENOUS at 07:21

## 2017-03-15 RX ADMIN — TENOFOVIR DISPROXIL FUMARATE SCH MG: 300 TABLET ORAL at 09:44

## 2017-03-15 RX ADMIN — SODIUM CHLORIDE SCH MLS/HR: 0.9 INJECTION, SOLUTION INTRAVENOUS at 14:01

## 2017-03-15 RX ADMIN — SODIUM CHLORIDE SCH MLS/HR: 0.9 INJECTION, SOLUTION INTRAVENOUS at 03:30

## 2017-03-15 NOTE — DISCHARGE SUMMARY
Providers





- Providers


Date of Admission: 


03/12/17 14:51





Date of discharge: 03/15/17


Attending physician: 


VIVIANE WRIGHT MD





 





03/13/17 01:27


Consult to Physician [CONS] Routine 


   Consulting Provider: KWASI HUFFMAN


   Reason For Exam: Acute renal failure


   Place consult to:: renal


   Notified:: office


   Phone number called:: [phone]


   Was contact made?: Yes


   If yes, spoke with:: lupillo


   Time called:: 10:30


   Comment:: hodan on call











Primary care physician: 


PRIMARY CARE MD








Hospitalization


Reason for admission: altered mental status


Condition: Stable


Hospital course: 


Patient is a 26-year-old -American male with history of HIV/AIDS, 

Admitted with altered mental status after a week of methamphetamine binge and 

returning home had multiple episodes of nausea with vomiting.  History shows 

that he has had treatment for Cryptosporidium in December 2016 with noted low 

CD4 count of the time, he reports that he is on the care of and infectious 

diseases physician at this time.  On admission was noted to have 

rhabdomyolysis.  Patient was started on aggressive fluid resuscitation with 

initially worsening of the creatinine kinase which has since begun to improve 

today.  This muscle pain has since resolved.  He is clinically stable for 

discharge we did have extensive counseling on the need to avoid substance abuse 

patient verbalized understanding.  He understands the risk of continuing 

behavior.  He is to follow with nephrologist in a few days for repeat renal 

function study





Discharge diagnosis


* Acute none traumatic rhabdomyolysis


* Acute metabolic encephalopathy


* Hyponatremia


* Hypokalemia


* Substance abuse-amphetamine


* Acute kidney injury-likely secondary to visible today nephropathy present on 

admission


* AIDS





Disposition: DISCHARGED TO HOME OR SELFCARE


Time spent for discharge: 35 mins





Core Measure Documentation





- Palliative Care


Palliative Care/ Comfort Measures: Not Applicable





- Core Measures


Any of the following diagnoses?: none





- VTE Discharge Requirements


Deep Vein Thrombosis/Pulmonary Embolism Present on Admission: No





Exam





- Physical Exam


Narrative exam: 


 VITAL SIGNS:  Reviewed.    


GENERAL:  The patient appeared well nourished and normally developed. Vital 

signs as documented.


HEAD:  No signs of head trauma.


EYES:  Pupils are equal.  Extraocular motions intact.  


EARS:  Hearing grossly intact.


MOUTH:  Oropharynx is normal. 


NECK:  No adenopathy, no JVD.   


CHEST:  Chest with clear breath sounds bilaterally.  No wheezes, rales, or 

rhonchi.  


CARDIAC:  Regular rate and rhythm.  S1 and S2, without murmurs, gallops, or 

rubs.


VASCULAR:  No Edema.  Peripheral pulses normal and equal in all extremities.


ABDOMEN:  Soft, without detectable tenderness.  No sign of distention.  No   

rebound or guarding, and no masses palpated.   Bowel Sounds normal.


MUSCULOSKELETAL:  Good range of motion of all major joints. Extremities without 

clubbing, cyanosis or edema.  


NEUROLOGIC EXAM:  Alert and oriented x 3.  Still lethargic.  No focal sensory 

or strength deficits.   Speech improved.  Follows commands.


PSYCHIATRIC:  Mood normal.


SKIN:  No rash or lesions.














- Constitutional


Vitals: 


 











Temp Pulse Resp BP Pulse Ox


 


 99.3 F   88   20   131/77   99 


 


 03/15/17 00:00  03/15/17 00:00  03/15/17 00:00  03/15/17 00:00  03/14/17 20:00














Plan


Activity: advance as tolerated, fall precautions


Diet: regular


Special Instructions: smoking cessation, other (must avoid substance abuse)


Additional Instructions: Patient should follow with Nephrologist in 2 days for 

repeat renal function study


Follow up with: 


PRIMARY CARE,MD [Primary Care Provider] - 3-5 Days


LACY CARRENO MD [Staff Physician] - 7 Days

## 2017-03-15 NOTE — PROGRESS NOTE
Assessment and Plan





(1) Hyponatremia


Current Visit: Yes   Status: Acute   


improved with IVF, continue to monitor





(2) Acute renal failure


Current Visit: Yes   Status: Acute   


Qualifiers: 


   Acute renal failure type: A 


Improving


Avoid nephrotoxins


OK to d/c from renal standpoint





(3) Rhabdomyolysis


Current Visit: Yes   Status: Acute   


Qualifiers: 


   Rhabdomyolysis type: R   Encounter type: E 


CPK improved today, OK to d/c from renal standpoint





(4) AIDS


Current Visit: No   Status: Acute  


deffered to ID  





(5) Hypokalemia


Current Visit: No   Status: Acute  


Marginally improved, replete K  








Subjective


Date of service: 03/15/17


Interval history: 





Pt seen lying in bed, NAD








Objective





- Exam


Narrative Exam: 








General appearance: well-developed, appears stated age


EENT: PERRL, mucous membranes moist, hearing intact


Neck: no JVD, no carotid bruit, supple


Respiratory: Present: Clear to Ascultation, Normal Exam


Cardiology: regular, normal heart rate, S1S2, no murmurs


Gastrointestinal: normal, normoactive bowel sounds


Integumentary: no rash, warm and dry


Neurologic: alert and oriented x3


Musculoskeletal: other (moves all extremitiex)


Psychiatric: cooperative








- Vital Signs


Vital signs: 


 Vital Signs - 12hr











  03/15/17 03/15/17





  08:29 12:18


 


Temperature 99.3 F 100.7 F H


 


Pulse Rate [ 80 99 H





Right Radial]  


 


Respiratory 16 16





Rate  


 


Blood Pressure 127/72 130/67





[Right Arm]  


 


O2 Sat by Pulse 99 





Oximetry  














- Lab





 03/14/17 05:44





 03/14/17 05:44


 Most recent lab results











Calcium  8.0 mg/dL (8.4-10.2)  L  03/14/17  05:44    


 


Phosphorus  2.8 mg/dL (2.5-4.5)   03/13/17  04:06    


 


Magnesium  2.8 mg/dL (1.7-2.3)  H  03/13/17  04:06

## 2017-03-15 NOTE — EVENT NOTE
Date: 03/15/17





Repeat creatinine phosphokinase.  If continues to improve, okay to discharge 

this afternoon.  Encourage liberal oral fluid intake on discharge

## 2017-03-20 NOTE — QUERY- GENERAL
Reva Ferro________________   Date:_03/20/17____________    



/CDS:Carl Wright/Germaine_____________  Phone#:_03/20/17__________



Exercise your independent professional judgment when responding to this query. 

Questions asked do not imply a particular answer is desired or expected. We 
greatly 

appreciate your clarification on this issue.           

Clinical Documentation States:

Patient is a 26-year-old -American male with history of HIV/AIDS, 
Admitted with altered mental status after a week of methamphetamine binge and I'
ll return home had multiple episodes of nausea with vomiting.

     





Clinical Findings Show (include reference to source document):









Given the above clinical scenario can you please provide an appropriate 
diagnosis

based on your knowledge of the patient:

PHYSICIAN RESPONSE: 

Methamphetamine Binge with Altered Mental Status:

[  ] Poisoning

[ x ] Adverse effect of drug

[  ] Other_________

[  ] Unable to determine









Present on Admission:    [x ] Yes (Y)       [ ] Clinically undeterminable (W)  
    [ ]No(N)  





Please also document response in your Progress Notes and/or Discharge Summary 
and 

indicate if the condition   was present on admission.













LIZZD

## 2019-02-17 ENCOUNTER — HOSPITAL ENCOUNTER (INPATIENT)
Dept: HOSPITAL 5 - ED | Age: 29
LOS: 9 days | Discharge: HOME | DRG: 976 | End: 2019-02-26
Attending: INTERNAL MEDICINE | Admitting: INTERNAL MEDICINE
Payer: COMMERCIAL

## 2019-02-17 DIAGNOSIS — Z82.49: ICD-10-CM

## 2019-02-17 DIAGNOSIS — N43.3: ICD-10-CM

## 2019-02-17 DIAGNOSIS — N45.1: ICD-10-CM

## 2019-02-17 DIAGNOSIS — A41.9: Primary | ICD-10-CM

## 2019-02-17 DIAGNOSIS — B20: ICD-10-CM

## 2019-02-17 LAB
BASOPHILS # (AUTO): 0 K/MM3 (ref 0–0.1)
BASOPHILS NFR BLD AUTO: 0.2 % (ref 0–1.8)
BILIRUB UR QL STRIP: (no result)
BLOOD UR QL VISUAL: (no result)
BUN SERPL-MCNC: 8 MG/DL (ref 9–20)
BUN/CREAT SERPL: 7 %
CALCIUM SERPL-MCNC: 8.7 MG/DL (ref 8.4–10.2)
EOSINOPHIL # BLD AUTO: 0 K/MM3 (ref 0–0.4)
EOSINOPHIL NFR BLD AUTO: 0 % (ref 0–4.3)
HCT VFR BLD CALC: 42 % (ref 35.5–45.6)
HEMOLYSIS INDEX: 4
HGB BLD-MCNC: 14.8 GM/DL (ref 11.8–15.2)
LYMPHOCYTES # BLD AUTO: 1.1 K/MM3 (ref 1.2–5.4)
LYMPHOCYTES NFR BLD AUTO: 14.6 % (ref 13.4–35)
MCHC RBC AUTO-ENTMCNC: 35 % (ref 32–34)
MCV RBC AUTO: 90 FL (ref 84–94)
MONOCYTES # (AUTO): 0.8 K/MM3 (ref 0–0.8)
MONOCYTES % (AUTO): 10.3 % (ref 0–7.3)
PH UR STRIP: 5 [PH] (ref 5–7)
PLATELET # BLD: 228 K/MM3 (ref 140–440)
RBC # BLD AUTO: 4.68 M/MM3 (ref 3.65–5.03)
RBC #/AREA URNS HPF: 3 /HPF (ref 0–6)
UROBILINOGEN UR-MCNC: < 2 MG/DL (ref ?–2)
WBC #/AREA URNS HPF: 24 /HPF (ref 0–6)

## 2019-02-17 PROCEDURE — 86593 SYPHILIS TEST NON-TREP QUANT: CPT

## 2019-02-17 PROCEDURE — 80048 BASIC METABOLIC PNL TOTAL CA: CPT

## 2019-02-17 PROCEDURE — 85007 BL SMEAR W/DIFF WBC COUNT: CPT

## 2019-02-17 PROCEDURE — 82962 GLUCOSE BLOOD TEST: CPT

## 2019-02-17 PROCEDURE — 87040 BLOOD CULTURE FOR BACTERIA: CPT

## 2019-02-17 PROCEDURE — 86592 SYPHILIS TEST NON-TREP QUAL: CPT

## 2019-02-17 PROCEDURE — 86780 TREPONEMA PALLIDUM: CPT

## 2019-02-17 PROCEDURE — 82140 ASSAY OF AMMONIA: CPT

## 2019-02-17 PROCEDURE — 36415 COLL VENOUS BLD VENIPUNCTURE: CPT

## 2019-02-17 PROCEDURE — 85025 COMPLETE CBC W/AUTO DIFF WBC: CPT

## 2019-02-17 PROCEDURE — 87591 N.GONORRHOEAE DNA AMP PROB: CPT

## 2019-02-17 PROCEDURE — 93975 VASCULAR STUDY: CPT

## 2019-02-17 PROCEDURE — 87086 URINE CULTURE/COLONY COUNT: CPT

## 2019-02-17 PROCEDURE — 80202 ASSAY OF VANCOMYCIN: CPT

## 2019-02-17 PROCEDURE — 81001 URINALYSIS AUTO W/SCOPE: CPT

## 2019-02-17 NOTE — ULTRASOUND REPORT
FINAL REPORT



PROCEDURE:  US TESTICULAR DOPPLER COMP



TECHNIQUE:  Real-time gray-scale and color flow Doppler sonography in multiple planes of the scrotum,
 testicles, and epididymes was performed. Velocity spectral waveform analysis Doppler imaging of the 
arterial inflow and venous outflow of the testicles was performed with image documentation. CPT 24872
 and 37082







HISTORY:  left testicular pain and swelling 



COMPARISON:  No prior studies are available for comparison.



FINDINGS:  

RIGHT TESTICLE:  



Size: 4.5 x 4.0 x 2.1 cm .



Appearance: Normal size and echotexture .



Arterial blood flow: Normal spectral waveforms, flow velocities and color flow images..



Venous blood flow: Normal spectral waveforms and color flow images.



Right epididymis: Normal size and echotexture .



Hydrocele: None .



LEFT TESTICLE



Size: 4.3 x 4.3 x 2.6 cm .



Appearance: Normal size and echotexture .



Arterial blood flow: Normal spectral waveforms, flow velocities and color flow images..



Venous blood flow: Normal spectral waveforms and color flow images.



Leftepididymis: Enlarged measuring 2.3 cm. There is increased blood flow.



Hydrocele: Small hydrocele.







IMPRESSION:  

There is no intratesticular mass. There is normal blood flow to the testicles.



Left epididymitis. Small left hydrocele

## 2019-02-17 NOTE — EMERGENCY DEPARTMENT REPORT
ED Male  HPI





- General


Chief complaint: Urogenital-Male


Stated complaint: TESTICULAR PAIN


Time Seen by Provider: 02/17/19 19:47


Source: patient


Mode of arrival: Ambulatory


Limitations: No Limitations





- History of Present Illness


Initial comments: 





28-year-old male presents to ED with left testicular pain 5 days.  Patient 

states he began to experience pain 5 days ago.  It then went away for one day, 

but returned.  Patient reports constant pain for the last 3 days.  Patient 

reports onset at rest.  Denies any trauma to his groin area.  Pt has history of 

HIV, seen at the [facility].  States most recent CD4 count around 160-180.


MD Complaint: testicle pain, testicle swelling


-: days(s) (5)


Location: left testicle


Radiation: other (LLQ abdomen)


Severity: severe


Quality: aching


Consistency: constant


Improves with: none


Worsens with: palpation


fever, nausea/vomiting.  denies: discharge, dysuria





- Related Data


                                Home Medications











 Medication  Instructions  Recorded  Confirmed  Last Taken


 


Darunavir/Cobicistat (Nf) 1 each PO DAILY 03/12/17 03/12/17 Unknown





[Prezcobix 800 mg-150 mg (Nf)]    


 


Dolutegravir Sodium [Tivicay] 50 mg PO DAILY 03/12/17 03/12/17 Unknown


 


Tenofovir [Viread] 300 mg PO QDAY 03/12/17 03/12/17 Unknown











                                    Allergies











Allergy/AdvReac Type Severity Reaction Status Date / Time


 


No Known Allergies Allergy   Unverified 12/04/16 14:17














ED Review of Systems


ROS: 


Stated complaint: TESTICULAR PAIN


Other details as noted in HPI





Comment: All other systems reviewed and negative


Constitutional: fever


Gastrointestinal: abdominal pain, nausea.  denies: vomiting


Genitourinary: frequency, testicular pain.  denies: dysuria, discharge





ED Past Medical Hx





- Past Medical History


Hx Congestive Heart Failure: No


Hx Diabetes: No


Hx Asthma: No


Hx COPD: No


Hx HIV: Yes


Additional medical history: AIDS, Acute Renal Failure





- Surgical History


Past Surgical History?: No





- Social History


Smoking Status: Never Smoker


Substance Use Type: None





- Medications


Home Medications: 


                                Home Medications











 Medication  Instructions  Recorded  Confirmed  Last Taken  Type


 


Darunavir/Cobicistat (Nf) 1 each PO DAILY 03/12/17 03/12/17 Unknown History





[Prezcobix 800 mg-150 mg (Nf)]     


 


Dolutegravir Sodium [Tivicay] 50 mg PO DAILY 03/12/17 03/12/17 Unknown History


 


Tenofovir [Viread] 300 mg PO QDAY 03/12/17 03/12/17 Unknown History














ED Physical Exam





- General


Limitations: No Limitations


General appearance: alert, in no apparent distress





- Head


Head exam: Present: atraumatic, normocephalic





- Eye


Eye exam: Present: normal appearance





- ENT


ENT exam: Present: mucous membranes moist





- Neck


Neck exam: Present: normal inspection





- Respiratory


Respiratory exam: Present: normal lung sounds bilaterally.  Absent: respiratory 

distress





- Cardiovascular


Cardiovascular Exam: Present: normal rhythm, tachycardia





- GI/Abdominal


GI/Abdominal exam: Present: soft.  Absent: distended, tenderness





- 


 exam: Present: testicular tenderness (on the left), scrotal swelling (mild, 

left-sided)


External exam: Present: normal external exam.  Absent: erythema, lesions, 

ecchymosis





- Extremities Exam


Extremities exam: Present: normal inspection





- Neurological Exam


Neurological exam: Present: alert, oriented X3





- Psychiatric


Psychiatric exam: Present: normal affect, normal mood





- Skin


Skin exam: Present: warm, dry, intact, normal color.  Absent: rash





ED Course


                                   Vital Signs











  02/17/19 02/17/19 02/17/19





  19:48 21:09 22:01


 


Temperature 99.7 F H 102.4 F H 


 


Pulse Rate 113 H 108 H 


 


Respiratory 18 19 





Rate   


 


Blood Pressure 130/80  147/80


 


Blood Pressure  147/80 





[Left]   


 


O2 Sat by Pulse 100 98 98





Oximetry   














  02/17/19 02/17/19 02/18/19





  23:01 23:09 00:55


 


Temperature  100.4 F H 100.8 F H


 


Pulse Rate   120 H


 


Respiratory   18





Rate   


 


Blood Pressure 143/84  


 


Blood Pressure   146/80





[Left]   


 


O2 Sat by Pulse 98  97





Oximetry   














ED Medical Decision Making





- Lab Data


Result diagrams: 


                                 02/17/19 19:56





                                 02/17/19 19:56





- Radiology Data


Radiology results: report reviewed, image reviewed





- Medical Decision Making





28-year-old male with history of HIV presents with left testicular pain.  

Ultrasound shows patient has acute epididymitis.  Patient initially febrile to 

102.4 and tachycardic.  WBCs within normal range, lactic acid is normal.  The 

patient remains persistently febrile and tachycardic despite Tylenol, Motrin, IV

 fluids, antibiotics, and pain medications.  Rocephin, azithromycin, and Le

vaquin given.  Patient reported most recent CD4 count is less than 200.  Patient

 likely septic due to inability to mount an effective immune response.  Will 

admit to hospitalist, Dr. Christianson, for further evaluation.





- Differential Diagnosis


torsion, orchitis, epidydimitis


Critical Care Time: Yes (60)


Critical care time in (mins) excluding proc time.: 60


Critical care attestation.: 


If time is entered above; I have spent that time in minutes in the direct care 

of this critically ill patient, excluding procedure time.





Critical Care Time: 





60 minutes





ED Disposition


Clinical Impression: 


 Acute epididymitis, Sepsis





Disposition: DC-09 OP ADMIT IP TO THIS HOSP


Is pt being admited?: Yes


Condition: Stable


Instructions:  Epididymitis (ED)


Referrals: 


PRIMARY CARE,MD [Referring] - 3-5 Days


Forms:  STI Treatment and Prevention


Time of Disposition: 01:28

## 2019-02-17 NOTE — EMERGENCY DEPARTMENT REPORT
Addendum entered and electronically signed by EL FLORES NP  02/17/19 

20:01: 








Blank Doc





- Documentation


Documentation: 





Correction: Male





Original Note:








Blank Doc





- Documentation


Documentation: 





This is a 28-year-old female that presents with left testicular pain and 

swelling.  Denies any urinary symptoms. Denies any flank pain.  Denies any 

trauma. 








This initial assessment diagnostic orders/clinical plan/treatment(s) is/are 

subject to change based on patient's health status, clinical progression and re-

assessment by fellow clinical providers in the ED.  Further treatment and workup

at subsequent clinical providers discretion.  Patient/guardians urged not to 

elope from ED s their condition may be serious if not clinically assessed and 

managed.  Initial orders include:


1-patient sent to main ED for further evaluation and treatment.


2- labs


3- [place] state ordered


4- UA


5- Charge RN notified of patient to be brought back ASAP

## 2019-02-18 LAB
ANISOCYTOSIS BLD QL SMEAR: (no result)
BAND NEUTROPHILS # (MANUAL): 0 K/MM3
BUN SERPL-MCNC: 7 MG/DL (ref 9–20)
BUN/CREAT SERPL: 7 %
CALCIUM SERPL-MCNC: 8.2 MG/DL (ref 8.4–10.2)
HCT VFR BLD CALC: 42 % (ref 35.5–45.6)
HEMOLYSIS INDEX: 2
HGB BLD-MCNC: 14.4 GM/DL (ref 11.8–15.2)
MCHC RBC AUTO-ENTMCNC: 34 % (ref 32–34)
MCV RBC AUTO: 90 FL (ref 84–94)
MYELOCYTES # (MANUAL): 0 K/MM3
PLATELET # BLD: 201 K/MM3 (ref 140–440)
PROMYELOCYTES # (MANUAL): 0 K/MM3
RBC # BLD AUTO: 4.66 M/MM3 (ref 3.65–5.03)
TOTAL CELLS COUNTED BLD: 100

## 2019-02-18 RX ADMIN — LEVOFLOXACIN SCH MLS/HR: 500 INJECTION, SOLUTION INTRAVENOUS at 23:05

## 2019-02-18 RX ADMIN — SODIUM CHLORIDE SCH MLS/HR: 0.9 INJECTION, SOLUTION INTRAVENOUS at 04:43

## 2019-02-18 RX ADMIN — SODIUM CHLORIDE SCH MLS/HR: 0.9 INJECTION, SOLUTION INTRAVENOUS at 12:52

## 2019-02-18 RX ADMIN — ENOXAPARIN SODIUM SCH MG: 100 INJECTION SUBCUTANEOUS at 10:08

## 2019-02-18 RX ADMIN — MORPHINE SULFATE PRN MG: 2 INJECTION, SOLUTION INTRAMUSCULAR; INTRAVENOUS at 19:52

## 2019-02-18 RX ADMIN — MORPHINE SULFATE PRN MG: 2 INJECTION, SOLUTION INTRAMUSCULAR; INTRAVENOUS at 10:09

## 2019-02-18 RX ADMIN — MORPHINE SULFATE PRN MG: 2 INJECTION, SOLUTION INTRAMUSCULAR; INTRAVENOUS at 06:04

## 2019-02-18 RX ADMIN — Medication SCH ML: at 10:09

## 2019-02-18 RX ADMIN — SODIUM CHLORIDE SCH MLS/HR: 0.9 INJECTION, SOLUTION INTRAVENOUS at 19:53

## 2019-02-18 RX ADMIN — MORPHINE SULFATE PRN MG: 2 INJECTION, SOLUTION INTRAMUSCULAR; INTRAVENOUS at 14:25

## 2019-02-18 RX ADMIN — Medication SCH ML: at 23:06

## 2019-02-18 RX ADMIN — ACETAMINOPHEN PRN MG: 325 TABLET ORAL at 18:57

## 2019-02-18 RX ADMIN — CEFTRIAXONE SODIUM SCH MLS/HR: 1 INJECTION, POWDER, FOR SOLUTION INTRAMUSCULAR; INTRAVENOUS at 10:07

## 2019-02-18 NOTE — HISTORY AND PHYSICAL REPORT
History of Present Illness


Date of examination: 02/18/19


History of present illness: 


28-year-old man with a history of HIV, CD4 count of 180 comes emergency room 

with complaints of left lip pain that started on Monday.  He describes the pain 

as someone squeezing his testicle, constant, intensity 10/10, no radiation, bet

ter with morphine, pain is worse with ambulation in, when he does any kind of 

maneuver.  Admits to fever chills


Review of systems


Constitutional: no  weight loss


Ears, eyes, nose, mouth and throat: no nasal congestion, no nasal discharge, no 

sinus pressure, no vision change, no red eye.


Neck: No neck pain or rigidity.


Cardiovascular: no palpitations, chest pain


Respiratory: no cough, shortness of breath


Gastrointestinal: no hematochezia, abdominal pain


Genitourinary : no  frequency , no hematuria


Musculoskeletal: no joint swelling or muscle ache 


Integumentary: no rash, no pruritis


Neurological: no parathesias, no focal weakness


Endocrine: no cold or heat intolerance, no polyuria or polydipsia


Hematologic/Lymphatic: no easy bruising, no easy bleeding, no gland swelling


Allergic/Immunologic: no urticaria, no angioedema.





PAST MEDICAL HISTORY:HIV, CD4 count of 180





PAST SURGICAL HISTORY: None





SOCIAL HISTORY +alcohol, Deniesb drugs, tobacco





FAMILY HISTORY: Hypertension








Medications and Allergies


                                    Allergies











Allergy/AdvReac Type Severity Reaction Status Date / Time


 


No Known Allergies Allergy   Unverified 12/04/16 14:17











                                Home Medications











 Medication  Instructions  Recorded  Confirmed  Last Taken  Type


 


Darunavir/Cobicistat (Nf) 1 each PO DAILY 03/12/17 03/12/17 Unknown History





[Prezcobix 800 mg-150 mg (Nf)]     


 


Dolutegravir Sodium [Tivicay] 50 mg PO DAILY 03/12/17 03/12/17 Unknown History


 


Tenofovir [Viread] 300 mg PO QDAY 03/12/17 03/12/17 Unknown History











Active Meds: 


Active Medications





Sodium Chloride (Nacl 0.9% 1000 Ml)  1,000 mls @ 999 mls/hr IV BOLUS ONE


   Stop: 02/18/19 02:13


   Last Admin: 02/18/19 01:19 Dose:  999 mls/hr


   Documented by: 











Exam





- Physical Exam


Narrative exam: 


General Apperance: The patient lying in bed,  breathing comfortable 


HEENT: Normocephalic, atraumatic.  Pupils equally round and reactive to light, 

EOMI, no sclericterus or JVD or thyromegaly or nodule.  , no carotid bruit, 

mucous membranes moist, no exudate or erythema


Heart: S1-S2, regular is rhythm


Lungs: Clear to auscultation bilaterally, breathing comfortable


Abdomen: Positive bowel sounds, soft, nontender, nondistended, no organomegaly


Extremities: Left testicle swollen, tender, no erythema, No edema cyanosis 

clubbing


Skin: no rash, nodule, warm and dry


Neuro: cranial nerves 2-12 intact, speech is fluent, motor/sensory intact








- Constitutional


Vitals: 


                                        











Temp Pulse Resp BP Pulse Ox


 


 100.8 F H  120 H  18   146/80   97 


 


 02/18/19 00:55  02/18/19 00:55  02/18/19 00:55  02/18/19 00:55  02/18/19 00:55














Results





- Labs


CBC & Chem 7: 


                                 02/17/19 19:56





                                 02/17/19 19:56


Labs: 


                              Abnormal lab results











  02/17/19 02/17/19 02/17/19 Range/Units





  19:56 19:56 21:09 


 


MCHC  35 H    (32-34)  %


 


RDW  13.1 L    (13.2-15.2)  %


 


Mono % (Auto)  10.3 H    (0.0-7.3)  %


 


Lymph #  1.1 L    (1.2-5.4)  K/mm3


 


Seg Neutrophils %  74.9 H    (40.0-70.0)  %


 


Sodium   135 L   (137-145)  mmol/L


 


Chloride   96.4 L   ()  mmol/L


 


BUN   8 L   (9-20)  mg/dL


 


Glucose   114 H   ()  mg/dL


 


Urine WBC (Auto)    24.0 H  (0.0-6.0)  /HPF














Assessment and Plan


Testicular ultrasound reviewed





Assessment


Sepsis


Epididymitis of the left testicle


HIV





Plan


Start IV fluids, IV antibiotics, follow cultures


DVT prophylaxis

## 2019-02-18 NOTE — EVENT NOTE
Date: 02/18/19


28-year-old male patient was admitted this morning with, left epididymitis and 

hydrocele


Patient is started on antibiotics, medical records reviewed, Agree with current 

management


Plan of care is reviewed for the patient and his, closely monitor


Possible discharge in 1-2 days stable, they will consult urology if no 

improvement

## 2019-02-19 RX ADMIN — Medication SCH ML: at 15:12

## 2019-02-19 RX ADMIN — SODIUM CHLORIDE SCH MLS/HR: 0.9 INJECTION, SOLUTION INTRAVENOUS at 21:21

## 2019-02-19 RX ADMIN — ENOXAPARIN SODIUM SCH MG: 100 INJECTION SUBCUTANEOUS at 10:51

## 2019-02-19 RX ADMIN — ACETAMINOPHEN PRN MG: 325 TABLET ORAL at 23:55

## 2019-02-19 RX ADMIN — CEFTRIAXONE SODIUM SCH MLS/HR: 1 INJECTION, POWDER, FOR SOLUTION INTRAMUSCULAR; INTRAVENOUS at 10:51

## 2019-02-19 RX ADMIN — ACETAMINOPHEN PRN MG: 325 TABLET ORAL at 06:05

## 2019-02-19 RX ADMIN — ACETAMINOPHEN PRN MG: 325 TABLET ORAL at 00:14

## 2019-02-19 RX ADMIN — SODIUM CHLORIDE SCH MLS/HR: 0.9 INJECTION, SOLUTION INTRAVENOUS at 10:53

## 2019-02-19 RX ADMIN — LEVOFLOXACIN SCH MLS/HR: 500 INJECTION, SOLUTION INTRAVENOUS at 21:23

## 2019-02-19 RX ADMIN — SODIUM CHLORIDE SCH MLS/HR: 0.9 INJECTION, SOLUTION INTRAVENOUS at 03:18

## 2019-02-19 RX ADMIN — ACETAMINOPHEN PRN MG: 325 TABLET ORAL at 16:38

## 2019-02-19 RX ADMIN — MORPHINE SULFATE PRN MG: 2 INJECTION, SOLUTION INTRAMUSCULAR; INTRAVENOUS at 23:36

## 2019-02-19 RX ADMIN — ONDANSETRON PRN MG: 2 INJECTION INTRAMUSCULAR; INTRAVENOUS at 00:16

## 2019-02-19 RX ADMIN — Medication SCH ML: at 22:39

## 2019-02-19 RX ADMIN — MORPHINE SULFATE PRN MG: 2 INJECTION, SOLUTION INTRAMUSCULAR; INTRAVENOUS at 00:15

## 2019-02-19 RX ADMIN — MORPHINE SULFATE PRN MG: 2 INJECTION, SOLUTION INTRAMUSCULAR; INTRAVENOUS at 10:52

## 2019-02-19 RX ADMIN — MORPHINE SULFATE PRN MG: 2 INJECTION, SOLUTION INTRAMUSCULAR; INTRAVENOUS at 15:12

## 2019-02-19 NOTE — PROGRESS NOTE
Assessment and Plan


Assessment and plan: 


--Lt. Epididymitis:


on Rocephin and Levaquin, follow cultures, supportive care





--Persistent fevers; secondary to sepsis


Continue empiric antibiotics, follow cultures, ID evaluation





--Sepsis: Secondary to epididymitis, scrotal support


IV antibiotics, antipyretics, follow cultures





--HIV : cont current management





--DVT prophylaxis; Lovenox





Closely monitor the patient and adjust management as needed





Plan of care reviewed with the patient and his nurse





History


Interval history: 


Patient seen and examined medical records reviewed


Continues to have pain in the left scrotum


Febrile MAXIMUM TEMPERATURE 102


Mild distress


Vital signs reviewed








Hospitalist Physical





- Constitutional


Vitals: 


                                        











Temp Pulse Resp BP Pulse Ox


 


 100.2 F H  102 H  20   145/86   100 


 


 02/19/19 11:16  02/19/19 11:16  02/19/19 11:16  02/19/19 11:16  02/19/19 11:16











General appearance: Present: no acute distress, well-nourished





- EENT


Eyes: Present: PERRL, EOM intact





- Neck


Neck: Present: supple, normal ROM





- Respiratory


Respiratory effort: normal


Respiratory: bilateral: diminished, negative: rales, rhonchi, wheezing





- Cardiovascular


Rhythm: regular


Heart Sounds: Present: S1 & S2





- Extremities


Extremities: no ischemia, No edema





- Abdominal


General gastrointestinal: soft, non-tender, non-distended, normal bowel sounds





- Integumentary


Integumentary: Present: clear, warm, dry, erythema





- Psychiatric


Psychiatric: appropriate mood/affect, cooperative





- Neurologic


Neurologic: CNII-XII intact, moves all extremities





- Additional findings


Additional findings: 


Swelling tenderness left scrotum








Results





- Labs


CBC & Chem 7: 


                                 02/18/19 06:53





                                 02/18/19 06:53


Labs: 


                             Laboratory Last Values











WBC  9.8 K/mm3 (4.5-11.0)   02/18/19  06:53    


 


RBC  4.66 M/mm3 (3.65-5.03)   02/18/19  06:53    


 


Hgb  14.4 gm/dl (11.8-15.2)   02/18/19  06:53    


 


Hct  42.0 % (35.5-45.6)   02/18/19  06:53    


 


MCV  90 fl (84-94)   02/18/19  06:53    


 


MCH  31 pg (28-32)   02/18/19  06:53    


 


MCHC  34 % (32-34)   02/18/19  06:53    


 


RDW  13.3 % (13.2-15.2)   02/18/19  06:53    


 


Plt Count  201 K/mm3 (140-440)   02/18/19  06:53    


 


Lymph % (Auto)  14.6 % (13.4-35.0)   02/17/19  19:56    


 


Mono % (Auto)  10.3 % (0.0-7.3)  H  02/17/19  19:56    


 


Eos % (Auto)  0.0 % (0.0-4.3)   02/17/19  19:56    


 


Baso % (Auto)  0.2 % (0.0-1.8)   02/17/19  19:56    


 


Lymph #  1.1 K/mm3 (1.2-5.4)  L  02/17/19  19:56    


 


Mono #  0.8 K/mm3 (0.0-0.8)   02/17/19  19:56    


 


Eos #  0.0 K/mm3 (0.0-0.4)   02/17/19  19:56    


 


Baso #  0.0 K/mm3 (0.0-0.1)   02/17/19  19:56    


 


Add Manual Diff  Complete   02/18/19  06:53    


 


Total Counted  100   02/18/19  06:53    


 


Seg Neutrophils %  74.9 % (40.0-70.0)  H  02/17/19  19:56    


 


Seg Neuts % (Manual)  83.0 % (40.0-70.0)  H  02/18/19  06:53    


 


Band Neutrophils %  0 %  02/18/19  06:53    


 


Lymphocytes % (Manual)  13.0 % (13.4-35.0)  L  02/18/19  06:53    


 


Reactive Lymphs % (Man)  0 %  02/18/19  06:53    


 


Monocytes % (Manual)  4.0 % (0.0-7.3)   02/18/19  06:53    


 


Eosinophils % (Manual)  0 % (0.0-4.3)   02/18/19  06:53    


 


Basophils % (Manual)  0 % (0.0-1.8)   02/18/19  06:53    


 


Metamyelocytes %  0 %  02/18/19  06:53    


 


Myelocytes %  0 %  02/18/19  06:53    


 


Promyelocytes %  0 %  02/18/19  06:53    


 


Blast Cells %  0 %  02/18/19  06:53    


 


Nucleated RBC %  Not Reportable   02/18/19  06:53    


 


Seg Neutrophils #  5.8 K/mm3 (1.8-7.7)   02/17/19  19:56    


 


Seg Neutrophils # Man  8.1 K/mm3 (1.8-7.7)  H  02/18/19  06:53    


 


Band Neutrophils #  0.0 K/mm3  02/18/19  06:53    


 


Lymphocytes # (Manual)  1.3 K/mm3 (1.2-5.4)   02/18/19  06:53    


 


Abs React Lymphs (Man)  0.0 K/mm3  02/18/19  06:53    


 


Monocytes # (Manual)  0.4 K/mm3 (0.0-0.8)   02/18/19  06:53    


 


Eosinophils # (Manual)  0.0 K/mm3 (0.0-0.4)   02/18/19  06:53    


 


Basophils # (Manual)  0.0 K/mm3 (0.0-0.1)   02/18/19  06:53    


 


Metamyelocytes #  0.0 K/mm3  02/18/19  06:53    


 


Myelocytes #  0.0 K/mm3  02/18/19  06:53    


 


Promyelocytes #  0.0 K/mm3  02/18/19  06:53    


 


Blast Cells #  0.0 K/mm3  02/18/19  06:53    


 


WBC Morphology  Not Reportable   02/18/19  06:53    


 


Hypersegmented Neuts  Not Reportable   02/18/19  06:53    


 


Hyposegmented Neuts  Not Reportable   02/18/19  06:53    


 


Hypogranular Neuts  Not Reportable   02/18/19  06:53    


 


Smudge Cells  Not Reportable   02/18/19  06:53    


 


Toxic Granulation  Not Reportable   02/18/19  06:53    


 


Toxic Vacuolation  Not Reportable   02/18/19  06:53    


 


Dohle Bodies  Not Reportable   02/18/19  06:53    


 


Pelger-Huet Anomaly  Not Reportable   02/18/19  06:53    


 


Carmine Rods  Not Reportable   02/18/19  06:53    


 


Platelet Estimate  Consistent w auto   02/18/19  06:53    


 


Clumped Platelets  Not Reportable   02/18/19  06:53    


 


Plt Clumps, EDTA  Not Reportable   02/18/19  06:53    


 


Large Platelets  Not Reportable   02/18/19  06:53    


 


Giant Platelets  Not Reportable   02/18/19  06:53    


 


Platelet Satelliting  Not Reportable   02/18/19  06:53    


 


Plt Morphology Comment  Not Reportable   02/18/19  06:53    


 


RBC Morphology  Not Reportable   02/18/19  06:53    


 


Dimorphic RBCs  Not Reportable   02/18/19  06:53    


 


Polychromasia  Not Reportable   02/18/19  06:53    


 


Hypochromasia  Not Reportable   02/18/19  06:53    


 


Poikilocytosis  Not Reportable   02/18/19  06:53    


 


Anisocytosis  1+   02/18/19  06:53    


 


Microcytosis  Not Reportable   02/18/19  06:53    


 


Macrocytosis  Not Reportable   02/18/19  06:53    


 


Spherocytes  Not Reportable   02/18/19  06:53    


 


Pappenheimer Bodies  Not Reportable   02/18/19  06:53    


 


Sickle Cells  Not Reportable   02/18/19  06:53    


 


Target Cells  Not Reportable   02/18/19  06:53    


 


Tear Drop Cells  Not Reportable   02/18/19  06:53    


 


Ovalocytes  Not Reportable   02/18/19  06:53    


 


Helmet Cells  Not Reportable   02/18/19  06:53    


 


[place] Bodies  Not Reportable   02/18/19  06:53    


 


Cabot Rings  Not Reportable   02/18/19  06:53    


 


[place] Cells  Not Reportable   02/18/19  06:53    


 


Bite Cells  Not Reportable   02/18/19  06:53    


 


Crenated Cell  Not Reportable   02/18/19  06:53    


 


Elliptocytes  Not Reportable   02/18/19  06:53    


 


Acanthocytes (Spur)  Not Reportable   02/18/19  06:53    


 


Rouleaux  Not Reportable   02/18/19  06:53    


 


Hemoglobin C Crystals  Not Reportable   02/18/19  06:53    


 


Schistocytes  Not Reportable   02/18/19  06:53    


 


Malaria parasites  Not Reportable   02/18/19  06:53    


 


Agusto Bodies  Not Reportable   02/18/19  06:53    


 


Hem Pathologist Commnt  No   02/18/19  06:53    


 


Sodium  140 mmol/L (137-145)   02/18/19  06:53    


 


Potassium  3.9 mmol/L (3.6-5.0)   02/18/19  06:53    


 


Chloride  100.6 mmol/L ()   02/18/19  06:53    


 


Carbon Dioxide  25 mmol/L (22-30)   02/18/19  06:53    


 


Anion Gap  18 mmol/L  02/18/19  06:53    


 


BUN  7 mg/dL (9-20)  L  02/18/19  06:53    


 


Creatinine  1.0 mg/dL (0.8-1.5)   02/18/19  06:53    


 


Estimated GFR  > 60 ml/min  02/18/19  06:53    


 


BUN/Creatinine Ratio  7 %  02/18/19  06:53    


 


Glucose  111 mg/dL ()  H  02/18/19  06:53    


 


POC Glucose  96  ()   02/18/19  09:02    


 


Lactic Acid  1.10 mmol/L (0.7-2.0)   02/17/19  20:16    


 


Calcium  8.2 mg/dL (8.4-10.2)  L  02/18/19  06:53    


 


Urine Color  Yellow  (Yellow)   02/17/19  21:09    


 


Urine Turbidity  Clear  (Clear)   02/17/19  21:09    


 


Urine pH  5.0  (5.0-7.0)   02/17/19  21:09    


 


Ur Specific Gravity  1.023  (1.003-1.030)   02/17/19  21:09    


 


Urine Protein  100 mg/dl mg/dL (Negative)   02/17/19  21:09    


 


Urine Glucose (UA)  Neg mg/dL (Negative)   02/17/19  21:09    


 


Urine Ketones  Neg mg/dL (Negative)   02/17/19  21:09    


 


Urine Blood  Neg  (Negative)   02/17/19  21:09    


 


Urine Nitrite  Neg  (Negative)   02/17/19  21:09    


 


Urine Bilirubin  Neg  (Negative)   02/17/19  21:09    


 


Urine Urobilinogen  < 2.0 mg/dL (<2.0)   02/17/19  21:09    


 


Ur Leukocyte Esterase  Sm  (Negative)   02/17/19  21:09    


 


Urine WBC (Auto)  24.0 /HPF (0.0-6.0)  H  02/17/19  21:09    


 


Urine RBC (Auto)  3.0 /HPF (0.0-6.0)   02/17/19  21:09    


 


U Epithel Cells (Auto)  < 1.0 /HPF (0-13.0)   02/17/19  21:09

## 2019-02-20 LAB
BASOPHILS # (AUTO): 0 K/MM3 (ref 0–0.1)
BASOPHILS NFR BLD AUTO: 0.1 % (ref 0–1.8)
BUN SERPL-MCNC: 5 MG/DL (ref 9–20)
BUN/CREAT SERPL: 6 %
CALCIUM SERPL-MCNC: 7.8 MG/DL (ref 8.4–10.2)
EOSINOPHIL # BLD AUTO: 0 K/MM3 (ref 0–0.4)
EOSINOPHIL NFR BLD AUTO: 0.1 % (ref 0–4.3)
HCT VFR BLD CALC: 40 % (ref 35.5–45.6)
HEMOLYSIS INDEX: 6
HGB BLD-MCNC: 13.3 GM/DL (ref 11.8–15.2)
LYMPHOCYTES # BLD AUTO: 1.6 K/MM3 (ref 1.2–5.4)
LYMPHOCYTES NFR BLD AUTO: 14.3 % (ref 13.4–35)
MCHC RBC AUTO-ENTMCNC: 33 % (ref 32–34)
MCV RBC AUTO: 91 FL (ref 84–94)
MONOCYTES # (AUTO): 0.8 K/MM3 (ref 0–0.8)
MONOCYTES % (AUTO): 6.7 % (ref 0–7.3)
PLATELET # BLD: 169 K/MM3 (ref 140–440)
RBC # BLD AUTO: 4.38 M/MM3 (ref 3.65–5.03)

## 2019-02-20 RX ADMIN — Medication SCH ML: at 12:19

## 2019-02-20 RX ADMIN — DOXYCYCLINE SCH MLS/HR: 100 INJECTION, POWDER, LYOPHILIZED, FOR SOLUTION INTRAVENOUS at 15:31

## 2019-02-20 RX ADMIN — CEFTRIAXONE SODIUM SCH MLS/HR: 2 INJECTION, POWDER, FOR SOLUTION INTRAMUSCULAR; INTRAVENOUS at 15:28

## 2019-02-20 RX ADMIN — DOXYCYCLINE SCH MLS/HR: 100 INJECTION, POWDER, LYOPHILIZED, FOR SOLUTION INTRAVENOUS at 21:55

## 2019-02-20 RX ADMIN — ENOXAPARIN SODIUM SCH MG: 100 INJECTION SUBCUTANEOUS at 09:50

## 2019-02-20 RX ADMIN — MORPHINE SULFATE PRN MG: 2 INJECTION, SOLUTION INTRAMUSCULAR; INTRAVENOUS at 03:59

## 2019-02-20 RX ADMIN — MORPHINE SULFATE PRN MG: 2 INJECTION, SOLUTION INTRAMUSCULAR; INTRAVENOUS at 16:37

## 2019-02-20 RX ADMIN — MORPHINE SULFATE PRN MG: 2 INJECTION, SOLUTION INTRAMUSCULAR; INTRAVENOUS at 12:18

## 2019-02-20 RX ADMIN — SODIUM CHLORIDE SCH MLS/HR: 0.9 INJECTION, SOLUTION INTRAVENOUS at 13:32

## 2019-02-20 RX ADMIN — SODIUM CHLORIDE SCH MLS/HR: 0.9 INJECTION, SOLUTION INTRAVENOUS at 05:18

## 2019-02-20 RX ADMIN — MORPHINE SULFATE PRN MG: 2 INJECTION, SOLUTION INTRAMUSCULAR; INTRAVENOUS at 21:52

## 2019-02-20 RX ADMIN — CEFTRIAXONE SODIUM SCH MLS/HR: 1 INJECTION, POWDER, FOR SOLUTION INTRAMUSCULAR; INTRAVENOUS at 09:46

## 2019-02-20 RX ADMIN — ACETAMINOPHEN PRN MG: 325 TABLET ORAL at 22:02

## 2019-02-20 RX ADMIN — Medication SCH ML: at 21:54

## 2019-02-20 RX ADMIN — MORPHINE SULFATE PRN MG: 2 INJECTION, SOLUTION INTRAMUSCULAR; INTRAVENOUS at 07:54

## 2019-02-20 NOTE — ULTRASOUND REPORT
ULTRASOUND SCROTAL



INDICATION: Worsening left epididymitis.



COMPARISON: 2/17/2019.



FINDINGS: Longitudinal and transverse grayscale and color flow 

sonographic evaluation of the scrotum and its contents again 

demonstrates bilateral testicular blood flow, though increased 

resistance now noted on the left with absent diastolic flow as on 

images 25-26.  No suspicious intratesticular lesions, though parenchyma 

may be slightly heterogeneous, more so on the left.  Moderate left 

scrotal edema also noted.



RIGHT TESTICLE measures 4.7 x 2.4 x 2.5 cm.  Left epididymal head is 

0.8 cm, image 14.  No hydrocele.



LEFT TESTICLE estimated at 5 x 2.6 x 4.5 cm.  Small complex 

peritesticular hydrocele noted with multiple intrinsic 

septations/echogenicities.  Extensive heterogeneity/hypervascular 

enlargement measuring up to 4.8 cm noted about the left epididymal head 

region as on images 32-36 with an isolated left epididymal head 

measurement approximately 1.4 cm as on image 36, where recognized.



CONCLUSION: 



1.  Interval change in left testicular blood flow pattern with high 

resistance flow now suspected, possibly secondary to worsening 

inflammation/epididymitis, as described above.  Please also correlate 

clinically.



2.  Other findings, including small complex left hydrocele and left 

scrotal edema.



3.  Right testicle appears unremarkable.



Thank you for the opportunity to participate in this patient's care.

## 2019-02-20 NOTE — CONSULTATION
History of Present Illness





- Reason for Consult


Consult date: 02/20/19


Left epididymitis, HIV


Requesting physician: AMY J KOCHERLA





- History of Present Illness


The patient is a 28-year-old male with history of HIV, has been on medications 

and been undetectable for the last few years, most recent CD4 count was 190. He 

presented to the emergency room on 02/17/2019 with complaints of left testicular

pain and swelling. He was started on antibiotics. Ultrasound revealed epidid

ymitis. Infectious diseases was consulted for further management.





Patient reports compliance with his HIV medications, has brought his pillbox and

is taking them appropriately. He reports being treated for gonorrhea and 

chlamydia in January 2019 based on positive test results however he states he h

as not been sexually active in about a year. When he switched doctors due to 

lack of insurance, he started going to [facility] which is when they ran several STD 

tests, but denied any symptoms at that time. He admits to MSM behavior. Denies 

any smoking, drinks occasionally. Since admission, in spite of antibiotics, he 

thinks the swelling and pain have continued to worsen. Continues to spike 

fevers.





Review of Systems: 


General: fevers +


HEENT: no new visual disturbance


Respiratory: No cough, sputum, hemoptysis or shortness of breath


Cardiovascular: No chest pain, syncope


Gastrointestinal: No nausea, vomiting or diarrhea


Genitourinary: No dysuria or hematuria


Musculoskeletal: No new or worsening neck pain or back pain 


Neurologic: No headaches, seizures


Hematologic: No easy bruising or bleeding


Endocrine: No night sweats or acute weight loss


Skin: negative for rash, jaundice


Psychiatric: No suicidal or homicidal ideation








Medications and Allergies


                                    Allergies











Allergy/AdvReac Type Severity Reaction Status Date / Time


 


No Known Allergies Allergy   Unverified 12/04/16 14:17











                                Home Medications











 Medication  Instructions  Recorded  Confirmed  Last Taken  Type


 


Darunavir/Cobicistat (Nf) 1 each PO DAILY 03/12/17 03/12/17 Unknown History





[Prezcobix 800 mg-150 mg (Nf)]     


 


Dolutegravir Sodium [Tivicay] 50 mg PO DAILY 03/12/17 03/12/17 Unknown History


 


Tenofovir [Viread] 300 mg PO QDAY 03/12/17 03/12/17 Unknown History











Active Meds: 


Active Medications





Acetaminophen (Tylenol)  650 mg PO Q4H PRN


   PRN Reason: Pain MILD(1-3)/Fever >100.5/HA


   Last Admin: 02/19/19 23:55 Dose:  650 mg


   Documented by: 


Doxycycline Hyclate (Vibramycin)  100 mg PO BID [facility]


Enoxaparin Sodium (Lovenox)  40 mg SUB-Q QDAY@1000 SABINA


   Last Admin: 02/20/19 09:50 Dose:  40 mg


   Documented by: 


Sodium Chloride (Nacl 0.9% 1000 Ml)  1,000 mls @ 150 mls/hr IV AS DIRECT SABINA


   Last Admin: 02/20/19 13:32 Dose:  150 mls/hr


   Documented by: 


Ceftriaxone Sodium (Rocephin/Ns 2 Gm/100 Ml)  2 gm in 100 mls @ 200 mls/hr IV 

Q24HR [facility]; Protocol


Miscellaneous Medication (Darunavir/Cobicistat (Nf))  1 each PO DAILY [facility]


Morphine Sulfate (Morphine)  2 mg IV Q4H PRN


   PRN Reason: Pain, Moderate (4-6)


   Last Admin: 02/20/19 12:18 Dose:  2 mg


   Documented by: 


Ondansetron HCl (Zofran)  4 mg IV Q4H PRN


   PRN Reason: Nausea And Vomiting


   Last Admin: 02/19/19 00:16 Dose:  4 mg


   Documented by: 


Sodium Chloride (Sodium Chloride Flush Syringe 10 Ml)  10 ml IV BID [facility]


   Last Admin: 02/20/19 12:19 Dose:  10 ml


   Documented by: 


Sodium Chloride (Sodium Chloride Flush Syringe 10 Ml)  10 ml IV PRN PRN


   PRN Reason: LINE FLUSH


Tenofovir Disoproxil Fumarate (Viread)  300 mg PO QDAY [facility]











Physical Examination





- Physical Exam


Narrative exam: 





Physical Exam: 


Constitutional: Alert, cooperative. No acute distress


Head, Ears, Nose: Normocephalic, atraumatic. External ears, nose normal


Eyes: Conjunctivae/corneas clear. No icterus. No ptosis.


Neck: Supple, no meningeal signs


Oral: dentition fair, no thrush


Cardiovascular: S1, S2 normal. 


Respiratory: Good air entry, clear to auscultation bilaterally


GI: Soft, non-tender; bowel sounds normal. No peritoneal signs


: left testicle with significant swelling, firm and tender. No penile lesions


Musculoskeletal: No pedal edema, no cyanosis.


Skin: No rash or abscess


Hem/Lymphatic: No palpable cervical or supraclavicular nodes. No lymphangitis


Psych: Mood ok. Affect normal


Neurological: Awake, alert, oriented. No gross abnormality





- Constitutional


Vitals: 


                                   Vital Signs











Temp Pulse Resp BP Pulse Ox


 


 102.8 F H  101 H  20   139/90   99 


 


 02/20/19 12:48  02/20/19 12:48  02/20/19 12:48  02/20/19 12:48  02/20/19 12:48








                           Temperature -Last 24 Hours











Temperature                    102.8 F


 


Temperature                    100.3 F


 


Temperature                    100.3 F


 


Temperature                    102.8 F

















Results





- Labs


CBC & Chem 7: 


                                 02/20/19 05:53





                                 02/20/19 05:53


Labs: 


                              Abnormal lab results











  02/20/19 02/20/19 Range/Units





  05:53 05:53 


 


WBC  11.4 H   (4.5-11.0)  K/mm3


 


Seg Neutrophils %  78.8 H   (40.0-70.0)  %


 


Seg Neutrophils #  9.0 H   (1.8-7.7)  K/mm3


 


Potassium   3.4 L  (3.6-5.0)  mmol/L


 


Chloride   97.5 L  ()  mmol/L


 


BUN   5 L  (9-20)  mg/dL


 


Calcium   7.8 L  (8.4-10.2)  mg/dL














Assessment and Plan





Cultures:


02/18/2019 blood culture: No growth


02/18/2019 urine culture: No growth





A/P:


28-year-old male with HIV:





#1 Left epididymitis: Worsening on antibiotics. Does report a history of 

chlamydia and gonorrhea in the past but has not been sexually active in the last

 1 year or so. We will increase ceftriaxone dose to 2 g every 24 hours and also 

add doxycycline. Discontinue levofloxacin. Given worsening in spite of 

antibiotics, we repeat a stat left testicular ultrasound and also recommend 

urology consult.





#2 HIV: Reportedly well controlled. On Descovy, Prezcobix and Tivicay, taking 

his own supply.





Recs:


RPR ordered


Please collect Gonorrhea Chlamydia NAAT sample


Repeat STAT left testicular US ordered


Urology consult given worsening 


d/marylin levofloxacin


increased Ceftriaxone dose to 2 gm daily


started Doxycycline





d/w Dr. Kocherla Kairav Shah, MD [facility] 


C: [phone]


O: [phone]


F: [phone]

## 2019-02-20 NOTE — PROGRESS NOTE
Assessment and Plan


Assessment and plan: 


--Lt. Epididymitis:


on Rocephin and Levaquin, follow cultures, supportive care





--Persistent fevers; secondary to sepsis


Continue empiric antibiotics, follow cultures, ID evaluation





--Sepsis: Secondary to epididymitis, scrotal support


IV antibiotics, antipyretics, follow cultures, urology consult if needed





--HIV : cont current management





--DVT prophylaxis; Lovenox





Closely monitor the patient and adjust management as needed





Plan of care reviewed with the patient and his nurse














History


Interval history: 


Patient seen and examined medical records reviewed


Continues to have fever prescription for pain significantly improved


Vital signs noted








Hospitalist Physical





- Constitutional


Vitals: 


                                        











Temp Pulse Resp BP Pulse Ox


 


 102.8 F H  101 H  20   139/90   99 


 


 02/20/19 12:48  02/20/19 12:48  02/20/19 12:48  02/20/19 12:48  02/20/19 12:48











General appearance: Present: no acute distress, well-nourished





- EENT


Eyes: Present: PERRL, EOM intact





- Neck


Neck: Present: supple, normal ROM





- Respiratory


Respiratory effort: normal


Respiratory: bilateral: diminished, negative: rales, rhonchi, wheezing





- Cardiovascular


Rhythm: regular


Heart Sounds: Present: S1 & S2





- Extremities


Extremities: no ischemia, No edema





- Abdominal


General gastrointestinal: soft, non-tender, non-distended, normal bowel sounds





- Integumentary


Integumentary: Present: clear, warm





- Psychiatric


Psychiatric: appropriate mood/affect, cooperative





- Neurologic


Neurologic: CNII-XII intact, moves all extremities





Results





- Labs


CBC & Chem 7: 


                                 02/20/19 05:53





                                 02/20/19 05:53


Labs: 


                             Laboratory Last Values











WBC  11.4 K/mm3 (4.5-11.0)  H  02/20/19  05:53    


 


RBC  4.38 M/mm3 (3.65-5.03)   02/20/19  05:53    


 


Hgb  13.3 gm/dl (11.8-15.2)   02/20/19  05:53    


 


Hct  40.0 % (35.5-45.6)   02/20/19  05:53    


 


MCV  91 fl (84-94)   02/20/19  05:53    


 


MCH  30 pg (28-32)   02/20/19  05:53    


 


MCHC  33 % (32-34)   02/20/19  05:53    


 


RDW  13.2 % (13.2-15.2)   02/20/19  05:53    


 


Plt Count  169 K/mm3 (140-440)   02/20/19  05:53    


 


Lymph % (Auto)  14.3 % (13.4-35.0)   02/20/19  05:53    


 


Mono % (Auto)  6.7 % (0.0-7.3)   02/20/19  05:53    


 


Eos % (Auto)  0.1 % (0.0-4.3)   02/20/19  05:53    


 


Baso % (Auto)  0.1 % (0.0-1.8)   02/20/19  05:53    


 


Lymph #  1.6 K/mm3 (1.2-5.4)   02/20/19  05:53    


 


Mono #  0.8 K/mm3 (0.0-0.8)   02/20/19  05:53    


 


Eos #  0.0 K/mm3 (0.0-0.4)   02/20/19  05:53    


 


Baso #  0.0 K/mm3 (0.0-0.1)   02/20/19  05:53    


 


Add Manual Diff  Complete   02/18/19  06:53    


 


Total Counted  100   02/18/19  06:53    


 


Seg Neutrophils %  78.8 % (40.0-70.0)  H  02/20/19  05:53    


 


Seg Neuts % (Manual)  83.0 % (40.0-70.0)  H  02/18/19  06:53    


 


Band Neutrophils %  0 %  02/18/19  06:53    


 


Lymphocytes % (Manual)  13.0 % (13.4-35.0)  L  02/18/19  06:53    


 


Reactive Lymphs % (Man)  0 %  02/18/19  06:53    


 


Monocytes % (Manual)  4.0 % (0.0-7.3)   02/18/19  06:53    


 


Eosinophils % (Manual)  0 % (0.0-4.3)   02/18/19  06:53    


 


Basophils % (Manual)  0 % (0.0-1.8)   02/18/19  06:53    


 


Metamyelocytes %  0 %  02/18/19  06:53    


 


Myelocytes %  0 %  02/18/19  06:53    


 


Promyelocytes %  0 %  02/18/19  06:53    


 


Blast Cells %  0 %  02/18/19  06:53    


 


Nucleated RBC %  Not Reportable   02/18/19  06:53    


 


Seg Neutrophils #  9.0 K/mm3 (1.8-7.7)  H  02/20/19  05:53    


 


Seg Neutrophils # Man  8.1 K/mm3 (1.8-7.7)  H  02/18/19  06:53    


 


Band Neutrophils #  0.0 K/mm3  02/18/19  06:53    


 


Lymphocytes # (Manual)  1.3 K/mm3 (1.2-5.4)   02/18/19  06:53    


 


Abs React Lymphs (Man)  0.0 K/mm3  02/18/19  06:53    


 


Monocytes # (Manual)  0.4 K/mm3 (0.0-0.8)   02/18/19  06:53    


 


Eosinophils # (Manual)  0.0 K/mm3 (0.0-0.4)   02/18/19  06:53    


 


Basophils # (Manual)  0.0 K/mm3 (0.0-0.1)   02/18/19  06:53    


 


Metamyelocytes #  0.0 K/mm3  02/18/19  06:53    


 


Myelocytes #  0.0 K/mm3  02/18/19  06:53    


 


Promyelocytes #  0.0 K/mm3  02/18/19  06:53    


 


Blast Cells #  0.0 K/mm3  02/18/19  06:53    


 


WBC Morphology  Not Reportable   02/18/19  06:53    


 


Hypersegmented Neuts  Not Reportable   02/18/19  06:53    


 


Hyposegmented Neuts  Not Reportable   02/18/19  06:53    


 


Hypogranular Neuts  Not Reportable   02/18/19  06:53    


 


Smudge Cells  Not Reportable   02/18/19  06:53    


 


Toxic Granulation  Not Reportable   02/18/19  06:53    


 


Toxic Vacuolation  Not Reportable   02/18/19  06:53    


 


Dohle Bodies  Not Reportable   02/18/19  06:53    


 


Pelger-Huet Anomaly  Not Reportable   02/18/19  06:53    


 


Carmine Rods  Not Reportable   02/18/19  06:53    


 


Platelet Estimate  Consistent w auto   02/18/19  06:53    


 


Clumped Platelets  Not Reportable   02/18/19  06:53    


 


Plt Clumps, EDTA  Not Reportable   02/18/19  06:53    


 


Large Platelets  Not Reportable   02/18/19  06:53    


 


Giant Platelets  Not Reportable   02/18/19  06:53    


 


Platelet Satelliting  Not Reportable   02/18/19  06:53    


 


Plt Morphology Comment  Not Reportable   02/18/19  06:53    


 


RBC Morphology  Not Reportable   02/18/19  06:53    


 


Dimorphic RBCs  Not Reportable   02/18/19  06:53    


 


Polychromasia  Not Reportable   02/18/19  06:53    


 


Hypochromasia  Not Reportable   02/18/19  06:53    


 


Poikilocytosis  Not Reportable   02/18/19  06:53    


 


Anisocytosis  1+   02/18/19  06:53    


 


Microcytosis  Not Reportable   02/18/19  06:53    


 


Macrocytosis  Not Reportable   02/18/19  06:53    


 


Spherocytes  Not Reportable   02/18/19  06:53    


 


Pappenheimer Bodies  Not Reportable   02/18/19  06:53    


 


Sickle Cells  Not Reportable   02/18/19  06:53    


 


Target Cells  Not Reportable   02/18/19  06:53    


 


Tear Drop Cells  Not Reportable   02/18/19  06:53    


 


Ovalocytes  Not Reportable   02/18/19  06:53    


 


Helmet Cells  Not Reportable   02/18/19  06:53    


 


Corona-[place] Bodies  Not Reportable   02/18/19  06:53    


 


Cabot Rings  Not Reportable   02/18/19  06:53    


 


Chilo Cells  Not Reportable   02/18/19  06:53    


 


Bite Cells  Not Reportable   02/18/19  06:53    


 


Crenated Cell  Not Reportable   02/18/19  06:53    


 


Elliptocytes  Not Reportable   02/18/19  06:53    


 


Acanthocytes (Spur)  Not Reportable   02/18/19  06:53    


 


Rouleaux  Not Reportable   02/18/19  06:53    


 


Hemoglobin C Crystals  Not Reportable   02/18/19  06:53    


 


Schistocytes  Not Reportable   02/18/19  06:53    


 


Malaria parasites  Not Reportable   02/18/19  06:53    


 


Agusto Bodies  Not Reportable   02/18/19  06:53    


 


Hem Pathologist Commnt  No   02/18/19  06:53    


 


Sodium  137 mmol/L (137-145)   02/20/19  05:53    


 


Potassium  3.4 mmol/L (3.6-5.0)  L  02/20/19  05:53    


 


Chloride  97.5 mmol/L ()  L  02/20/19  05:53    


 


Carbon Dioxide  28 mmol/L (22-30)   02/20/19  05:53    


 


Anion Gap  15 mmol/L  02/20/19  05:53    


 


BUN  5 mg/dL (9-20)  L  02/20/19  05:53    


 


Creatinine  0.9 mg/dL (0.8-1.5)   02/20/19  05:53    


 


Estimated GFR  > 60 ml/min  02/20/19  05:53    


 


BUN/Creatinine Ratio  6 %  02/20/19  05:53    


 


Glucose  99 mg/dL ()   02/20/19  05:53    


 


POC Glucose  96  ()   02/18/19  09:02    


 


Lactic Acid  1.10 mmol/L (0.7-2.0)   02/17/19  20:16    


 


Calcium  7.8 mg/dL (8.4-10.2)  L  02/20/19  05:53    


 


Urine Color  Yellow  (Yellow)   02/17/19  21:09    


 


Urine Turbidity  Clear  (Clear)   02/17/19  21:09    


 


Urine pH  5.0  (5.0-7.0)   02/17/19  21:09    


 


Ur Specific Gravity  1.023  (1.003-1.030)   02/17/19  21:09    


 


Urine Protein  100 mg/dl mg/dL (Negative)   02/17/19  21:09    


 


Urine Glucose (UA)  Neg mg/dL (Negative)   02/17/19  21:09    


 


Urine Ketones  Neg mg/dL (Negative)   02/17/19  21:09    


 


Urine Blood  Neg  (Negative)   02/17/19  21:09    


 


Urine Nitrite  Neg  (Negative)   02/17/19  21:09    


 


Urine Bilirubin  Neg  (Negative)   02/17/19  21:09    


 


Urine Urobilinogen  < 2.0 mg/dL (<2.0)   02/17/19  21:09    


 


Ur Leukocyte Esterase  Sm  (Negative)   02/17/19  21:09    


 


Urine WBC (Auto)  24.0 /HPF (0.0-6.0)  H  02/17/19  21:09    


 


Urine RBC (Auto)  3.0 /HPF (0.0-6.0)   02/17/19  21:09    


 


U Epithel Cells (Auto)  < 1.0 /HPF (0-13.0)   02/17/19  21:09

## 2019-02-20 NOTE — CONSULTATION
History of Present Illness





- Reason for Consult


Consult date: 02/20/19





- History of Present Illness





28-year-old male presents to ED with left testicular pain 5 days.  Patient 

states he began to experience pain 5 days ago.  It then went away for one day, 

but returned.  Patient reports constant pain for the last 3 days.  Patient 

reports onset at rest.  Denies any trauma to his groin area.  Pt has history of 

HIV, seen at the [facility]. 





cha epididymitis, no abscess





exam - swollen left testes (3x)





A/P





no surgical intervention needed at this time


continue abx





Medications and Allergies


                                    Allergies











Allergy/AdvReac Type Severity Reaction Status Date / Time


 


No Known Allergies Allergy   Unverified 12/04/16 14:17











                                Home Medications











 Medication  Instructions  Recorded  Confirmed  Last Taken  Type


 


Darunavir/Cobicistat (Nf) 1 each PO DAILY 03/12/17 03/12/17 Unknown History





[Prezcobix 800 mg-150 mg (Nf)]     


 


Dolutegravir Sodium [Tivicay] 50 mg PO DAILY 03/12/17 03/12/17 Unknown History


 


Tenofovir [Viread] 300 mg PO QDAY 03/12/17 03/12/17 Unknown History











Active Meds: 


Active Medications





Acetaminophen (Tylenol)  650 mg PO Q4H PRN


   PRN Reason: Pain MILD(1-3)/Fever >100.5/HA


   Last Admin: 02/19/19 23:55 Dose:  650 mg


   Documented by: 


Enoxaparin Sodium (Lovenox)  40 mg SUB-Q QDAY@1000 [facility]AB[facility]


   Last Admin: 02/20/19 09:50 Dose:  40 mg


   Documented by: 


Sodium Chloride (Nacl 0.9% 1000 Ml)  1,000 mls @ 150 mls/hr IV AS DIRECT SABINA


   Last Admin: 02/20/19 13:32 Dose:  150 mls/hr


   Documented by: 


Ceftriaxone Sodium (Rocephin/Ns 2 Gm/100 Ml)  2 gm in 100 mls @ 200 mls/hr IV 

Q24HR SABINA; Protocol


   Last Admin: 02/20/19 15:28 Dose:  200 mls/hr


   Documented by: 


Doxycycline Hyclate 100 mg/ (Sodium Chloride)  250 mls @ 250 mls/hr IV Q12HR 

[facility]AB[facility]; Protocol


   Last Admin: 02/20/19 15:31 Dose:  250 mls/hr


   Documented by: 


Miscellaneous Medication (Darunavir/Cobicistat (Nf))  1 each PO DAILY [facility]


Morphine Sulfate (Morphine)  2 mg IV Q4H PRN


   PRN Reason: Pain, Moderate (4-6)


   Last Admin: 02/20/19 12:18 Dose:  2 mg


   Documented by: 


Ondansetron HCl (Zofran)  4 mg IV Q4H PRN


   PRN Reason: Nausea And Vomiting


   Last Admin: 02/19/19 00:16 Dose:  4 mg


   Documented by: 


Sodium Chloride (Sodium Chloride Flush Syringe 10 Ml)  10 ml IV BID [facility]


   Last Admin: 02/20/19 12:19 Dose:  10 ml


   Documented by: 


Sodium Chloride (Sodium Chloride Flush Syringe 10 Ml)  10 ml IV PRN PRN


   PRN Reason: LINE FLUSH


Tenofovir Disoproxil Fumarate (Viread)  300 mg PO QDAY [facility]











Exam





- Constitutional


Vitals: 


                                        











Temp Pulse Resp BP Pulse Ox


 


 102.8 F H  101 H  20   139/90   99 


 


 02/20/19 12:48  02/20/19 12:48  02/20/19 12:48  02/20/19 12:48  02/20/19 12:48














Results





- Labs


CBC & Chem 7: 


                                 02/20/19 05:53





                                 02/20/19 05:53


Labs: 


                              Abnormal lab results











  02/20/19 02/20/19 Range/Units





  05:53 05:53 


 


WBC  11.4 H   (4.5-11.0)  K/mm3


 


Seg Neutrophils %  78.8 H   (40.0-70.0)  %


 


Seg Neutrophils #  9.0 H   (1.8-7.7)  K/mm3


 


Potassium   3.4 L  (3.6-5.0)  mmol/L


 


Chloride   97.5 L  ()  mmol/L


 


BUN   5 L  (9-20)  mg/dL


 


Calcium   7.8 L  (8.4-10.2)  mg/dL

## 2019-02-21 RX ADMIN — MORPHINE SULFATE PRN MG: 2 INJECTION, SOLUTION INTRAMUSCULAR; INTRAVENOUS at 07:56

## 2019-02-21 RX ADMIN — SODIUM CHLORIDE SCH MLS/HR: 0.9 INJECTION, SOLUTION INTRAVENOUS at 16:01

## 2019-02-21 RX ADMIN — VANCOMYCIN HYDROCHLORIDE SCH MLS/HR: 1 INJECTION, POWDER, LYOPHILIZED, FOR SOLUTION INTRAVENOUS at 17:40

## 2019-02-21 RX ADMIN — ACETAMINOPHEN PRN MG: 325 TABLET ORAL at 23:49

## 2019-02-21 RX ADMIN — MORPHINE SULFATE PRN MG: 2 INJECTION, SOLUTION INTRAMUSCULAR; INTRAVENOUS at 04:03

## 2019-02-21 RX ADMIN — DOXYCYCLINE SCH MLS/HR: 100 INJECTION, POWDER, LYOPHILIZED, FOR SOLUTION INTRAVENOUS at 21:51

## 2019-02-21 RX ADMIN — MORPHINE SULFATE PRN MG: 2 INJECTION, SOLUTION INTRAMUSCULAR; INTRAVENOUS at 16:01

## 2019-02-21 RX ADMIN — Medication SCH ML: at 21:52

## 2019-02-21 RX ADMIN — ACETAMINOPHEN PRN MG: 325 TABLET ORAL at 12:29

## 2019-02-21 RX ADMIN — CEFTRIAXONE SODIUM SCH MLS/HR: 2 INJECTION, POWDER, FOR SOLUTION INTRAMUSCULAR; INTRAVENOUS at 09:57

## 2019-02-21 RX ADMIN — ENOXAPARIN SODIUM SCH MG: 100 INJECTION SUBCUTANEOUS at 09:58

## 2019-02-21 RX ADMIN — MORPHINE SULFATE PRN MG: 2 INJECTION, SOLUTION INTRAMUSCULAR; INTRAVENOUS at 12:30

## 2019-02-21 RX ADMIN — VANCOMYCIN HYDROCHLORIDE SCH MLS/HR: 1 INJECTION, POWDER, LYOPHILIZED, FOR SOLUTION INTRAVENOUS at 23:48

## 2019-02-21 RX ADMIN — SODIUM CHLORIDE SCH MLS/HR: 0.9 INJECTION, SOLUTION INTRAVENOUS at 00:27

## 2019-02-21 RX ADMIN — SODIUM CHLORIDE SCH MLS/HR: 0.9 INJECTION, SOLUTION INTRAVENOUS at 07:53

## 2019-02-21 RX ADMIN — ONDANSETRON PRN MG: 2 INJECTION INTRAMUSCULAR; INTRAVENOUS at 17:38

## 2019-02-21 RX ADMIN — ACETAMINOPHEN PRN MG: 325 TABLET ORAL at 05:58

## 2019-02-21 RX ADMIN — MORPHINE SULFATE PRN MG: 2 INJECTION, SOLUTION INTRAMUSCULAR; INTRAVENOUS at 21:51

## 2019-02-21 RX ADMIN — DOXYCYCLINE SCH MLS/HR: 100 INJECTION, POWDER, LYOPHILIZED, FOR SOLUTION INTRAVENOUS at 11:40

## 2019-02-21 NOTE — PROGRESS NOTE
Assessment and Plan





Cultures:


02/18/2019 blood culture: No growth


02/18/2019 urine culture: No growth





A/P:


28-year-old male with HIV:





#1 Left epididymitis: Worsening on antibiotics. Does report a history of 

chlamydia and gonorrhea in the past but has not been sexually active in the last

1 year or so. We will increase ceftriaxone dose to 2 g every 24 hours and also 

add doxycycline. Discontinue levofloxacin. Given worsening in spite of 

antibiotics, we repeat a stat left testicular ultrasound and also recommend 

urology consult.


          Repeat testicular US -  Interval change in left testicular blood flow 

pattern with high resistance flow now suspected, possibly secondary to worsening

 inflammation/epididymitis.  Small complex left hydrocele and left  scrotal 

edema.  





#2 HIV: Reportedly well controlled. On Descovy, Prezcobix and Tivicay, taking 

his own supply.





#3 Fevers: continuing to spike fevers, continue ceftriaxone, doxycycline, add 

vancomycin. 





Recs:


 f/u RPR ordered


 f/u Gonorrhea Chlamydia NAAT sample


 f/u Urology consult  


Continue increased dose of  Ceftriaxone, 2 gm daily, D2


Continue Doxycycline, D2


Start Vancomycin 1 gm IV q 8H 














Letha Jarrod, NP


[facility] 


M: [id number]


O:[phone]





Subjective


Date of service: 02/21/19


Interval history: 





Patient seen and examined.  Stated that he continues to have testicular pain, 

spiking fevers. Denies SOB or rashes.  Nurses notes, labs, reports reviewed, 

discussed with patient.  





Objective





- Exam


Narrative Exam: 





Constitutional: Alert, cooperative. No acute distress


Head, Ears, Nose: Normocephalic, atraumatic. External ears, nose normal


Eyes: Conjunctivae/corneas clear. No icterus. No ptosis.


Neck: Supple, no meningeal signs


Oral: dentition fair, no thrush


Cardiovascular: S1, S2 normal. 


Respiratory: Good air entry, clear to auscultation bilaterally


GI: Soft, non-tender; bowel sounds normal. No peritoneal signs


: left testicle with significant swelling, firm and tender. No penile lesions


Musculoskeletal: No pedal edema, no cyanosis.


Skin: No rash or abscess


Hem/Lymphatic: No palpable cervical or supraclavicular nodes. No lymphangitis


Psych: Mood ok. Affect normal


Neurological: Awake, alert, oriented. No gross abnormality








- Constitutional


Vitals: 


                                   Vital Signs











Temp Pulse Resp BP Pulse Ox


 


 102.4 F H  118 H  20   131/76   97 


 


 02/21/19 04:47  02/21/19 04:47  02/21/19 04:47  02/21/19 04:47  02/21/19 04:47








                           Temperature -Last 24 Hours











Temperature                    102.4 F


 


Temperature                    99.2 F


 


Temperature                    99.7 F


 


Temperature                    102.8 F

















- Labs


CBC & Chem 7: 


                                 02/20/19 05:53





                                 02/20/19 05:53

## 2019-02-21 NOTE — PROGRESS NOTE
Assessment and Plan


Assessment and plan: 


--Lt. Epididymitis: Urology evaluation and recommendations noted and appreciated


No surgical intervention, advised to continue current antibiotics


ID adjusted the medications to on Rocephin and doxycycline, cultures negative to

date .





--Persistent fevers; secondary to sepsis, febrile, today MAXIMUM TEMPERATURE 

102


Continue empiric antibiotics, follow cultures, ID following





--Sepsis: Secondary to epididymitis, scrotal support


IV antibiotics, antipyretics, follow cultures, urology consult if needed





--HIV : cont current management





--DVT prophylaxis; Lovenox





Closely monitor the patient and adjust management as needed





Consults and recommendations noted and appreciated





Plan of care reviewed with the patient and his nurse











Hospitalist Physical





- Constitutional


Vitals: 


                                        











Temp Pulse Resp BP Pulse Ox


 


 102.4 F H  118 H  20   131/76   97 


 


 02/21/19 04:47  02/21/19 04:47  02/21/19 04:47  02/21/19 04:47  02/21/19 04:47











General appearance: Present: no acute distress, well-nourished





Results





- Labs


CBC & Chem 7: 


                                 02/20/19 05:53





                                 02/20/19 05:53


Labs: 


                             Laboratory Last Values











WBC  11.4 K/mm3 (4.5-11.0)  H  02/20/19  05:53    


 


RBC  4.38 M/mm3 (3.65-5.03)   02/20/19  05:53    


 


Hgb  13.3 gm/dl (11.8-15.2)   02/20/19  05:53    


 


Hct  40.0 % (35.5-45.6)   02/20/19  05:53    


 


MCV  91 fl (84-94)   02/20/19  05:53    


 


MCH  30 pg (28-32)   02/20/19  05:53    


 


MCHC  33 % (32-34)   02/20/19  05:53    


 


RDW  13.2 % (13.2-15.2)   02/20/19  05:53    


 


Plt Count  169 K/mm3 (140-440)   02/20/19  05:53    


 


Lymph % (Auto)  14.3 % (13.4-35.0)   02/20/19  05:53    


 


Mono % (Auto)  6.7 % (0.0-7.3)   02/20/19  05:53    


 


Eos % (Auto)  0.1 % (0.0-4.3)   02/20/19  05:53    


 


Baso % (Auto)  0.1 % (0.0-1.8)   02/20/19  05:53    


 


Lymph #  1.6 K/mm3 (1.2-5.4)   02/20/19  05:53    


 


Mono #  0.8 K/mm3 (0.0-0.8)   02/20/19  05:53    


 


Eos #  0.0 K/mm3 (0.0-0.4)   02/20/19  05:53    


 


Baso #  0.0 K/mm3 (0.0-0.1)   02/20/19  05:53    


 


Add Manual Diff  Complete   02/18/19  06:53    


 


Total Counted  100   02/18/19  06:53    


 


Seg Neutrophils %  78.8 % (40.0-70.0)  H  02/20/19  05:53    


 


Seg Neuts % (Manual)  83.0 % (40.0-70.0)  H  02/18/19  06:53    


 


Band Neutrophils %  0 %  02/18/19  06:53    


 


Lymphocytes % (Manual)  13.0 % (13.4-35.0)  L  02/18/19  06:53    


 


Reactive Lymphs % (Man)  0 %  02/18/19  06:53    


 


Monocytes % (Manual)  4.0 % (0.0-7.3)   02/18/19  06:53    


 


Eosinophils % (Manual)  0 % (0.0-4.3)   02/18/19  06:53    


 


Basophils % (Manual)  0 % (0.0-1.8)   02/18/19  06:53    


 


Metamyelocytes %  0 %  02/18/19  06:53    


 


Myelocytes %  0 %  02/18/19  06:53    


 


Promyelocytes %  0 %  02/18/19  06:53    


 


Blast Cells %  0 %  02/18/19  06:53    


 


Nucleated RBC %  Not Reportable   02/18/19  06:53    


 


Seg Neutrophils #  9.0 K/mm3 (1.8-7.7)  H  02/20/19  05:53    


 


Seg Neutrophils # Man  8.1 K/mm3 (1.8-7.7)  H  02/18/19  06:53    


 


Band Neutrophils #  0.0 K/mm3  02/18/19  06:53    


 


Lymphocytes # (Manual)  1.3 K/mm3 (1.2-5.4)   02/18/19  06:53    


 


Abs React Lymphs (Man)  0.0 K/mm3  02/18/19  06:53    


 


Monocytes # (Manual)  0.4 K/mm3 (0.0-0.8)   02/18/19  06:53    


 


Eosinophils # (Manual)  0.0 K/mm3 (0.0-0.4)   02/18/19  06:53    


 


Basophils # (Manual)  0.0 K/mm3 (0.0-0.1)   02/18/19  06:53    


 


Metamyelocytes #  0.0 K/mm3  02/18/19  06:53    


 


Myelocytes #  0.0 K/mm3  02/18/19  06:53    


 


Promyelocytes #  0.0 K/mm3  02/18/19  06:53    


 


Blast Cells #  0.0 K/mm3  02/18/19  06:53    


 


WBC Morphology  Not Reportable   02/18/19  06:53    


 


Hypersegmented Neuts  Not Reportable   02/18/19  06:53    


 


Hyposegmented Neuts  Not Reportable   02/18/19  06:53    


 


Hypogranular Neuts  Not Reportable   02/18/19  06:53    


 


Smudge Cells  Not Reportable   02/18/19  06:53    


 


Toxic Granulation  Not Reportable   02/18/19  06:53    


 


Toxic Vacuolation  Not Reportable   02/18/19  06:53    


 


Dohle Bodies  Not Reportable   02/18/19  06:53    


 


Pelger-Huet Anomaly  Not Reportable   02/18/19  06:53    


 


Carmine Rods  Not Reportable   02/18/19  06:53    


 


Platelet Estimate  Consistent w auto   02/18/19  06:53    


 


Clumped Platelets  Not Reportable   02/18/19  06:53    


 


Plt Clumps, EDTA  Not Reportable   02/18/19  06:53    


 


Large Platelets  Not Reportable   02/18/19  06:53    


 


Giant Platelets  Not Reportable   02/18/19  06:53    


 


Platelet Satelliting  Not Reportable   02/18/19  06:53    


 


Plt Morphology Comment  Not Reportable   02/18/19  06:53    


 


RBC Morphology  Not Reportable   02/18/19  06:53    


 


Dimorphic RBCs  Not Reportable   02/18/19  06:53    


 


Polychromasia  Not Reportable   02/18/19  06:53    


 


Hypochromasia  Not Reportable   02/18/19  06:53    


 


Poikilocytosis  Not Reportable   02/18/19  06:53    


 


Anisocytosis  1+   02/18/19  06:53    


 


Microcytosis  Not Reportable   02/18/19  06:53    


 


Macrocytosis  Not Reportable   02/18/19  06:53    


 


Spherocytes  Not Reportable   02/18/19  06:53    


 


Pappenheimer Bodies  Not Reportable   02/18/19  06:53    


 


Sickle Cells  Not Reportable   02/18/19  06:53    


 


Target Cells  Not Reportable   02/18/19  06:53    


 


Tear Drop Cells  Not Reportable   02/18/19  06:53    


 


Ovalocytes  Not Reportable   02/18/19  06:53    


 


Helmet Cells  Not Reportable   02/18/19  06:53    


 


[place] Bodies  Not Reportable   02/18/19  06:53    


 


Cabot Rings  Not Reportable   02/18/19  06:53    


 


Chilo Cells  Not Reportable   02/18/19  06:53    


 


Bite Cells  Not Reportable   02/18/19  06:53    


 


Crenated Cell  Not Reportable   02/18/19  06:53    


 


Elliptocytes  Not Reportable   02/18/19  06:53    


 


Acanthocytes (Spur)  Not Reportable   02/18/19  06:53    


 


Rouleaux  Not Reportable   02/18/19  06:53    


 


Hemoglobin C Crystals  Not Reportable   02/18/19  06:53    


 


Schistocytes  Not Reportable   02/18/19  06:53    


 


Malaria parasites  Not Reportable   02/18/19  06:53    


 


Agusto Bodies  Not Reportable   02/18/19  06:53    


 


Hem Pathologist Commnt  No   02/18/19  06:53    


 


Sodium  137 mmol/L (137-145)   02/20/19  05:53    


 


Potassium  3.4 mmol/L (3.6-5.0)  L  02/20/19  05:53    


 


Chloride  97.5 mmol/L ()  L  02/20/19  05:53    


 


Carbon Dioxide  28 mmol/L (22-30)   02/20/19  05:53    


 


Anion Gap  15 mmol/L  02/20/19  05:53    


 


BUN  5 mg/dL (9-20)  L  02/20/19  05:53    


 


Creatinine  0.9 mg/dL (0.8-1.5)   02/20/19  05:53    


 


Estimated GFR  > 60 ml/min  02/20/19  05:53    


 


BUN/Creatinine Ratio  6 %  02/20/19  05:53    


 


Glucose  99 mg/dL ()   02/20/19  05:53    


 


POC Glucose  96  ()   02/18/19  09:02    


 


Lactic Acid  1.10 mmol/L (0.7-2.0)   02/17/19  20:16    


 


Calcium  7.8 mg/dL (8.4-10.2)  L  02/20/19  05:53    


 


Urine Color  Yellow  (Yellow)   02/17/19  21:09    


 


Urine Turbidity  Clear  (Clear)   02/17/19  21:09    


 


Urine pH  5.0  (5.0-7.0)   02/17/19  21:09    


 


Ur Specific Gravity  1.023  (1.003-1.030)   02/17/19  21:09    


 


Urine Protein  100 mg/dl mg/dL (Negative)   02/17/19  21:09    


 


Urine Glucose (UA)  Neg mg/dL (Negative)   02/17/19  21:09    


 


Urine Ketones  Neg mg/dL (Negative)   02/17/19  21:09    


 


Urine Blood  Neg  (Negative)   02/17/19  21:09    


 


Urine Nitrite  Neg  (Negative)   02/17/19  21:09    


 


Urine Bilirubin  Neg  (Negative)   02/17/19  21:09    


 


Urine Urobilinogen  < 2.0 mg/dL (<2.0)   02/17/19  21:09    


 


Ur Leukocyte Esterase  Sm  (Negative)   02/17/19  21:09    


 


Urine WBC (Auto)  24.0 /HPF (0.0-6.0)  H  02/17/19  21:09    


 


Urine RBC (Auto)  3.0 /HPF (0.0-6.0)   02/17/19  21:09    


 


U Epithel Cells (Auto)  < 1.0 /HPF (0-13.0)   02/17/19  21:09

## 2019-02-22 LAB
BASOPHILS # (AUTO): 0 K/MM3 (ref 0–0.1)
BASOPHILS NFR BLD AUTO: 0.1 % (ref 0–1.8)
BUN SERPL-MCNC: 3 MG/DL (ref 9–20)
BUN/CREAT SERPL: 3 %
CALCIUM SERPL-MCNC: 7.7 MG/DL (ref 8.4–10.2)
EOSINOPHIL # BLD AUTO: 0 K/MM3 (ref 0–0.4)
EOSINOPHIL NFR BLD AUTO: 0.4 % (ref 0–4.3)
HCT VFR BLD CALC: 37 % (ref 35.5–45.6)
HEMOLYSIS INDEX: 6
HGB BLD-MCNC: 12.8 GM/DL (ref 11.8–15.2)
LYMPHOCYTES # BLD AUTO: 1.3 K/MM3 (ref 1.2–5.4)
LYMPHOCYTES NFR BLD AUTO: 15.3 % (ref 13.4–35)
MCHC RBC AUTO-ENTMCNC: 35 % (ref 32–34)
MCV RBC AUTO: 89 FL (ref 84–94)
MONOCYTES # (AUTO): 1.1 K/MM3 (ref 0–0.8)
MONOCYTES % (AUTO): 13.8 % (ref 0–7.3)
PLATELET # BLD: 212 K/MM3 (ref 140–440)
RBC # BLD AUTO: 4.15 M/MM3 (ref 3.65–5.03)

## 2019-02-22 RX ADMIN — LEVOFLOXACIN SCH MG: 750 TABLET, FILM COATED ORAL at 21:28

## 2019-02-22 RX ADMIN — ACETAMINOPHEN PRN MG: 325 TABLET ORAL at 17:49

## 2019-02-22 RX ADMIN — VANCOMYCIN HYDROCHLORIDE SCH MLS/HR: 1 INJECTION, POWDER, LYOPHILIZED, FOR SOLUTION INTRAVENOUS at 08:03

## 2019-02-22 RX ADMIN — DOLUTEGRAVIR SODIUM SCH: 50 TABLET, FILM COATED ORAL at 17:54

## 2019-02-22 RX ADMIN — SODIUM CHLORIDE SCH MLS/HR: 0.9 INJECTION, SOLUTION INTRAVENOUS at 15:13

## 2019-02-22 RX ADMIN — CEFTRIAXONE SODIUM SCH MLS/HR: 2 INJECTION, POWDER, FOR SOLUTION INTRAMUSCULAR; INTRAVENOUS at 10:01

## 2019-02-22 RX ADMIN — ENOXAPARIN SODIUM SCH MG: 100 INJECTION SUBCUTANEOUS at 10:00

## 2019-02-22 RX ADMIN — Medication SCH ML: at 10:03

## 2019-02-22 RX ADMIN — DOLUTEGRAVIR SODIUM SCH: 50 TABLET, FILM COATED ORAL at 17:55

## 2019-02-22 RX ADMIN — TENOFOVIR DISPROXIL FUMARATE SCH: 300 TABLET ORAL at 17:54

## 2019-02-22 RX ADMIN — MORPHINE SULFATE PRN MG: 2 INJECTION, SOLUTION INTRAMUSCULAR; INTRAVENOUS at 14:42

## 2019-02-22 RX ADMIN — MORPHINE SULFATE PRN MG: 2 INJECTION, SOLUTION INTRAMUSCULAR; INTRAVENOUS at 08:09

## 2019-02-22 RX ADMIN — MORPHINE SULFATE PRN MG: 2 INJECTION, SOLUTION INTRAMUSCULAR; INTRAVENOUS at 18:38

## 2019-02-22 RX ADMIN — TEMAZEPAM PRN MG: 15 CAPSULE ORAL at 22:28

## 2019-02-22 RX ADMIN — Medication SCH ML: at 21:29

## 2019-02-22 RX ADMIN — TENOFOVIR DISPROXIL FUMARATE SCH: 300 TABLET ORAL at 17:55

## 2019-02-22 RX ADMIN — MORPHINE SULFATE PRN MG: 2 INJECTION, SOLUTION INTRAMUSCULAR; INTRAVENOUS at 04:12

## 2019-02-22 RX ADMIN — SODIUM CHLORIDE SCH MLS/HR: 0.9 INJECTION, SOLUTION INTRAVENOUS at 03:29

## 2019-02-22 RX ADMIN — Medication SCH: at 10:03

## 2019-02-22 RX ADMIN — VANCOMYCIN HYDROCHLORIDE SCH MLS/HR: 1 INJECTION, POWDER, LYOPHILIZED, FOR SOLUTION INTRAVENOUS at 17:43

## 2019-02-22 RX ADMIN — DOXYCYCLINE SCH MLS/HR: 100 INJECTION, POWDER, LYOPHILIZED, FOR SOLUTION INTRAVENOUS at 12:29

## 2019-02-22 RX ADMIN — DOXYCYCLINE SCH MLS/HR: 100 INJECTION, POWDER, LYOPHILIZED, FOR SOLUTION INTRAVENOUS at 23:17

## 2019-02-22 NOTE — PROGRESS NOTE
Assessment and Plan


Assessment and plan: 


28-year-old -American male patient with significant history of HIV low 

CD4 count was admitted with fever and chills


 and testicular pain, workup consistent with left epididymitis.ID has evaluated 

adjusted the antibiotics, urology evaluatedat 


no surgical intervention continue antibiotics, patient continues to have fever, 

continues to have scrotal pain.  If no improvement


Reconsult urology








--Lt. Epididymitis: Urology evaluation and recommendations noted and appreciated


No surgical intervention, advised to continue current antibiotics


ID following adjusted Abx, on Rocephin , doxycycline and vancomycin


Patient continues to have scrotal pain and swelling and persistent fevers


Reconsult urology if no improvement





--Persistent fevers; urology evaluated no surgical intervention


ID following, supportive care





--Sepsis: Secondary to epididymitis, scrotal support


IV antibiotics, antipyretics, follow cultures, urology consult if needed





--HIV : cont current management





--DVT prophylaxis; Lovenox





Closely monitor the patient and adjust management as needed





Consults and recommendations noted and appreciated





Plan of care reviewed with the patient and his nurse











History


Interval history: 


Patient seen and examined medical records reviewed


Persistent fevers, continues to have pain in the scrotum and penis


Alert awake oriented


Vital signs reviewed








Hospitalist Physical





- Constitutional


Vitals: 


                                        











Temp Pulse Resp BP Pulse Ox


 


 99.4 F   85   18   141/86   98 


 


 02/22/19 05:01  02/22/19 05:01  02/22/19 05:01  02/22/19 05:01  02/22/19 05:01











General appearance: Present: mild distress, well-nourished, other (febrile 

febrile)





- EENT


Eyes: Present: PERRL, EOM intact





- Neck


Neck: Present: supple, normal ROM





- Respiratory


Respiratory effort: normal


Respiratory: bilateral: diminished, negative: rales, rhonchi, wheezing





- Cardiovascular


Rhythm: regular


Heart Sounds: Present: S1 & S2





- Extremities


Extremities: no ischemia, No edema





- Abdominal


General gastrointestinal: soft, non-tender, non-distended, normal bowel sounds





- Integumentary


Integumentary: Present: clear, warm





- Psychiatric


Psychiatric: appropriate mood/affect, cooperative





- Neurologic


Neurologic: CNII-XII intact, moves all extremities





Results





- Labs


CBC & Chem 7: 


                                 02/22/19 06:50





                                 02/22/19 06:50


Labs: 


                             Laboratory Last Values











WBC  8.3 K/mm3 (4.5-11.0)   02/22/19  06:50    


 


RBC  4.15 M/mm3 (3.65-5.03)   02/22/19  06:50    


 


Hgb  12.8 gm/dl (11.8-15.2)   02/22/19  06:50    


 


Hct  37.0 % (35.5-45.6)   02/22/19  06:50    


 


MCV  89 fl (84-94)   02/22/19  06:50    


 


MCH  31 pg (28-32)   02/22/19  06:50    


 


MCHC  35 % (32-34)  H  02/22/19  06:50    


 


RDW  12.8 % (13.2-15.2)  L  02/22/19  06:50    


 


Plt Count  212 K/mm3 (140-440)   02/22/19  06:50    


 


Lymph % (Auto)  15.3 % (13.4-35.0)   02/22/19  06:50    


 


Mono % (Auto)  13.8 % (0.0-7.3)  H  02/22/19  06:50    


 


Eos % (Auto)  0.4 % (0.0-4.3)   02/22/19  06:50    


 


Baso % (Auto)  0.1 % (0.0-1.8)   02/22/19  06:50    


 


Lymph #  1.3 K/mm3 (1.2-5.4)   02/22/19  06:50    


 


Mono #  1.1 K/mm3 (0.0-0.8)  H  02/22/19  06:50    


 


Eos #  0.0 K/mm3 (0.0-0.4)   02/22/19  06:50    


 


Baso #  0.0 K/mm3 (0.0-0.1)   02/22/19  06:50    


 


Add Manual Diff  Complete   02/18/19  06:53    


 


Total Counted  100   02/18/19  06:53    


 


Seg Neutrophils %  70.4 % (40.0-70.0)  H  02/22/19  06:50    


 


Seg Neuts % (Manual)  83.0 % (40.0-70.0)  H  02/18/19  06:53    


 


Band Neutrophils %  0 %  02/18/19  06:53    


 


Lymphocytes % (Manual)  13.0 % (13.4-35.0)  L  02/18/19  06:53    


 


Reactive Lymphs % (Man)  0 %  02/18/19  06:53    


 


Monocytes % (Manual)  4.0 % (0.0-7.3)   02/18/19  06:53    


 


Eosinophils % (Manual)  0 % (0.0-4.3)   02/18/19  06:53    


 


Basophils % (Manual)  0 % (0.0-1.8)   02/18/19  06:53    


 


Metamyelocytes %  0 %  02/18/19  06:53    


 


Myelocytes %  0 %  02/18/19  06:53    


 


Promyelocytes %  0 %  02/18/19  06:53    


 


Blast Cells %  0 %  02/18/19  06:53    


 


Nucleated RBC %  Not Reportable   02/18/19  06:53    


 


Seg Neutrophils #  5.8 K/mm3 (1.8-7.7)   02/22/19  06:50    


 


Seg Neutrophils # Man  8.1 K/mm3 (1.8-7.7)  H  02/18/19  06:53    


 


Band Neutrophils #  0.0 K/mm3  02/18/19  06:53    


 


Lymphocytes # (Manual)  1.3 K/mm3 (1.2-5.4)   02/18/19  06:53    


 


Abs React Lymphs (Man)  0.0 K/mm3  02/18/19  06:53    


 


Monocytes # (Manual)  0.4 K/mm3 (0.0-0.8)   02/18/19  06:53    


 


Eosinophils # (Manual)  0.0 K/mm3 (0.0-0.4)   02/18/19  06:53    


 


Basophils # (Manual)  0.0 K/mm3 (0.0-0.1)   02/18/19  06:53    


 


Metamyelocytes #  0.0 K/mm3  02/18/19  06:53    


 


Myelocytes #  0.0 K/mm3  02/18/19  06:53    


 


Promyelocytes #  0.0 K/mm3  02/18/19  06:53    


 


Blast Cells #  0.0 K/mm3  02/18/19  06:53    


 


WBC Morphology  Not Reportable   02/18/19  06:53    


 


Hypersegmented Neuts  Not Reportable   02/18/19  06:53    


 


Hyposegmented Neuts  Not Reportable   02/18/19  06:53    


 


Hypogranular Neuts  Not Reportable   02/18/19  06:53    


 


Smudge Cells  Not Reportable   02/18/19  06:53    


 


Toxic Granulation  Not Reportable   02/18/19  06:53    


 


Toxic Vacuolation  Not Reportable   02/18/19  06:53    


 


Dohle Bodies  Not Reportable   02/18/19  06:53    


 


Pelger-Huet Anomaly  Not Reportable   02/18/19  06:53    


 


Carmine Rods  Not Reportable   02/18/19  06:53    


 


Platelet Estimate  Consistent w auto   02/18/19  06:53    


 


Clumped Platelets  Not Reportable   02/18/19  06:53    


 


Plt Clumps, EDTA  Not Reportable   02/18/19  06:53    


 


Large Platelets  Not Reportable   02/18/19  06:53    


 


Giant Platelets  Not Reportable   02/18/19  06:53    


 


Platelet Satelliting  Not Reportable   02/18/19  06:53    


 


Plt Morphology Comment  Not Reportable   02/18/19  06:53    


 


RBC Morphology  Not Reportable   02/18/19  06:53    


 


Dimorphic RBCs  Not Reportable   02/18/19  06:53    


 


Polychromasia  Not Reportable   02/18/19  06:53    


 


Hypochromasia  Not Reportable   02/18/19  06:53    


 


Poikilocytosis  Not Reportable   02/18/19  06:53    


 


Anisocytosis  1+   02/18/19  06:53    


 


Microcytosis  Not Reportable   02/18/19  06:53    


 


Macrocytosis  Not Reportable   02/18/19  06:53    


 


Spherocytes  Not Reportable   02/18/19  06:53    


 


Pappenheimer Bodies  Not Reportable   02/18/19  06:53    


 


Sickle Cells  Not Reportable   02/18/19  06:53    


 


Target Cells  Not Reportable   02/18/19  06:53    


 


Tear Drop Cells  Not Reportable   02/18/19  06:53    


 


Ovalocytes  Not Reportable   02/18/19  06:53    


 


Helmet Cells  Not Reportable   02/18/19  06:53    


 


[place] Bodies  Not Reportable   02/18/19  06:53    


 


Cabot Rings  Not Reportable   02/18/19  06:53    


 


[place] Cells  Not Reportable   02/18/19  06:53    


 


Bite Cells  Not Reportable   02/18/19  06:53    


 


Crenated Cell  Not Reportable   02/18/19  06:53    


 


Elliptocytes  Not Reportable   02/18/19  06:53    


 


Acanthocytes (Spur)  Not Reportable   02/18/19  06:53    


 


Rouleaux  Not Reportable   02/18/19  06:53    


 


Hemoglobin C Crystals  Not Reportable   02/18/19  06:53    


 


Schistocytes  Not Reportable   02/18/19  06:53    


 


Malaria parasites  Not Reportable   02/18/19  06:53    


 


Agusto Bodies  Not Reportable   02/18/19  06:53    


 


Hem Pathologist Commnt  No   02/18/19  06:53    


 


Sodium  140 mmol/L (137-145)   02/22/19  06:50    


 


Potassium  3.0 mmol/L (3.6-5.0)  L  02/22/19  06:50    


 


Chloride  101.3 mmol/L ()   02/22/19  06:50    


 


Carbon Dioxide  25 mmol/L (22-30)   02/22/19  06:50    


 


Anion Gap  17 mmol/L  02/22/19  06:50    


 


BUN  3 mg/dL (9-20)  L  02/22/19  06:50    


 


Creatinine  0.9 mg/dL (0.8-1.5)   02/22/19  06:50    


 


Estimated GFR  > 60 ml/min  02/22/19  06:50    


 


BUN/Creatinine Ratio  3 %  02/22/19  06:50    


 


Glucose  96 mg/dL ()   02/22/19  06:50    


 


POC Glucose  96  ()   02/18/19  09:02    


 


Lactic Acid  1.10 mmol/L (0.7-2.0)   02/17/19  20:16    


 


Calcium  7.7 mg/dL (8.4-10.2)  L  02/22/19  06:50    


 


Urine Color  Yellow  (Yellow)   02/17/19  21:09    


 


Urine Turbidity  Clear  (Clear)   02/17/19  21:09    


 


Urine pH  5.0  (5.0-7.0)   02/17/19  21:09    


 


Ur Specific Gravity  1.023  (1.003-1.030)   02/17/19  21:09    


 


Urine Protein  100 mg/dl mg/dL (Negative)   02/17/19  21:09    


 


Urine Glucose (UA)  Neg mg/dL (Negative)   02/17/19  21:09    


 


Urine Ketones  Neg mg/dL (Negative)   02/17/19  21:09    


 


Urine Blood  Neg  (Negative)   02/17/19  21:09    


 


Urine Nitrite  Neg  (Negative)   02/17/19  21:09    


 


Urine Bilirubin  Neg  (Negative)   02/17/19  21:09    


 


Urine Urobilinogen  < 2.0 mg/dL (<2.0)   02/17/19  21:09    


 


Ur Leukocyte Esterase  Sm  (Negative)   02/17/19  21:09    


 


Urine WBC (Auto)  24.0 /HPF (0.0-6.0)  H  02/17/19  21:09    


 


Urine RBC (Auto)  3.0 /HPF (0.0-6.0)   02/17/19  21:09    


 


U Epithel Cells (Auto)  < 1.0 /HPF (0-13.0)   02/17/19  21:09    


 


RPR Titer  1:64   02/20/19  14:20    


 


RPR  Reactive  (Nonreactive)   02/20/19  14:20

## 2019-02-22 NOTE — PROGRESS NOTE
Assessment and Plan





Cultures:


02/18/2019 blood culture: No growth


02/18/2019 urine culture: No growth





A/P:


28-year-old male with HIV:





#1 Left epididymitis: Worsening on antibiotics. Does report a history of 

chlamydia and gonorrhea in the past but has not been sexually active in the last

1 year or so. We will increase ceftriaxone dose to 2 g every 24 hours and also 

add doxycycline. Discontinue levofloxacin. Given worsening in spite of 

antibiotics, we repeat a stat left testicular ultrasound  Per Dr. Najera, 

testicular US findings are due to increased inflammation, no role for surgery at

this time.





          Repeat testicular US -  Interval change in left testicular blood flow 

pattern with high resistance flow now suspected, possibly secondary to worsening

 inflammation/epididymitis.  Small complex left hydrocele and left  scrotal 

edema. 


         -RPR - reactive (treated January, 2019)





#2 HIV: Reportedly well controlled. On Descovy, Prezcobix and Tivicay, taking 

his own supply.





#3 Fevers: continuing to spike fevers, continue ceftriaxone, doxycycline, 

vancomycin. Add Levofloxacin PO. 





Recs:


 f/u Gonorrhea Chlamydia NAAT sample


 Continue increased dose of  Ceftriaxone, 2 gm daily, D3


Continue Doxycycline, D3


Continue  Vancomycin 1 gm IV q 8H, D1


Add Levofloxacin 750mg PO every 24 hours








Dr. Millan will be on call this weekend, [phone]. Please call for questions.





[facility] 


M: [id number]


O:[phone]





Subjective


Date of service: 02/22/19


Interval history: 





Patient seen and examined.  Stated that he continues to have worsening 

testicular pain, spiking fevers. Denies SOB or rashes.  Nurses notes, labs, 

reports reviewed, discussed with patient.  





Objective





- Exam


Narrative Exam: 





Constitutional: Alert, cooperative. worsening testicular pain. 


Head, Ears, Nose: Normocephalic, atraumatic. External ears, nose normal


Eyes: Conjunctivae/corneas clear. No icterus. No ptosis.


Neck: Supple, no meningeal signs


Oral: dentition fair, no thrush


Cardiovascular: S1, S2 normal. 


Respiratory: Good air entry, clear to auscultation bilaterally


GI: Soft, non-tender; bowel sounds normal. No peritoneal signs


: left testicle with significant swelling, firm and tender. No penile lesions


Musculoskeletal: No pedal edema, no cyanosis.


Skin: No rash or abscess


Hem/Lymphatic: No palpable cervical or supraclavicular nodes. No lymphangitis


Psych: Mood ok. Affect normal


Neurological: Awake, alert, oriented. No gross abnormality








- Constitutional


Vitals: 


                                   Vital Signs











Temp Pulse Resp BP Pulse Ox


 


 99.4 F   85   18   141/86   98 


 


 02/22/19 05:01  02/22/19 05:01  02/22/19 05:01  02/22/19 05:01  02/22/19 05:01








                           Temperature -Last 24 Hours











Temperature                    99.4 F


 


Temperature                    101.0 F


 


Temperature                    100.6 F


 


Temperature                    102.8 F

















- Labs


CBC & Chem 7: 


                                 02/22/19 06:50





                                 02/22/19 06:50


Labs: 


                              Abnormal lab results











  02/22/19 02/22/19 Range/Units





  06:50 06:50 


 


MCHC  35 H   (32-34)  %


 


RDW  12.8 L   (13.2-15.2)  %


 


Mono % (Auto)  13.8 H   (0.0-7.3)  %


 


Mono #  1.1 H   (0.0-0.8)  K/mm3


 


Seg Neutrophils %  70.4 H   (40.0-70.0)  %


 


Potassium   3.0 L  (3.6-5.0)  mmol/L


 


BUN   3 L  (9-20)  mg/dL


 


Calcium   7.7 L  (8.4-10.2)  mg/dL

## 2019-02-23 RX ADMIN — ENOXAPARIN SODIUM SCH MG: 100 INJECTION SUBCUTANEOUS at 11:40

## 2019-02-23 RX ADMIN — Medication SCH ML: at 22:53

## 2019-02-23 RX ADMIN — VANCOMYCIN HYDROCHLORIDE SCH MLS/HR: 5 INJECTION, POWDER, LYOPHILIZED, FOR SOLUTION INTRAVENOUS at 16:36

## 2019-02-23 RX ADMIN — VANCOMYCIN HYDROCHLORIDE SCH MLS/HR: 1 INJECTION, POWDER, LYOPHILIZED, FOR SOLUTION INTRAVENOUS at 00:39

## 2019-02-23 RX ADMIN — CEFTRIAXONE SODIUM SCH MLS/HR: 2 INJECTION, POWDER, FOR SOLUTION INTRAMUSCULAR; INTRAVENOUS at 11:40

## 2019-02-23 RX ADMIN — SODIUM CHLORIDE SCH MLS/HR: 0.9 INJECTION, SOLUTION INTRAVENOUS at 22:52

## 2019-02-23 RX ADMIN — MORPHINE SULFATE PRN MG: 2 INJECTION, SOLUTION INTRAMUSCULAR; INTRAVENOUS at 11:40

## 2019-02-23 RX ADMIN — MORPHINE SULFATE PRN MG: 2 INJECTION, SOLUTION INTRAMUSCULAR; INTRAVENOUS at 16:31

## 2019-02-23 RX ADMIN — SODIUM CHLORIDE SCH MLS/HR: 0.9 INJECTION, SOLUTION INTRAVENOUS at 14:26

## 2019-02-23 RX ADMIN — DOXYCYCLINE SCH MLS/HR: 100 INJECTION, POWDER, LYOPHILIZED, FOR SOLUTION INTRAVENOUS at 22:52

## 2019-02-23 RX ADMIN — SODIUM CHLORIDE SCH MLS/HR: 0.9 INJECTION, SOLUTION INTRAVENOUS at 01:42

## 2019-02-23 RX ADMIN — DOXYCYCLINE SCH MLS/HR: 100 INJECTION, POWDER, LYOPHILIZED, FOR SOLUTION INTRAVENOUS at 12:58

## 2019-02-23 RX ADMIN — MORPHINE SULFATE PRN MG: 2 INJECTION, SOLUTION INTRAMUSCULAR; INTRAVENOUS at 01:42

## 2019-02-23 RX ADMIN — Medication SCH ML: at 11:43

## 2019-02-23 RX ADMIN — LEVOFLOXACIN SCH MG: 750 TABLET, FILM COATED ORAL at 11:40

## 2019-02-23 RX ADMIN — VANCOMYCIN HYDROCHLORIDE SCH MLS/HR: 1 INJECTION, POWDER, LYOPHILIZED, FOR SOLUTION INTRAVENOUS at 09:13

## 2019-02-23 RX ADMIN — ACETAMINOPHEN PRN MG: 325 TABLET ORAL at 18:33

## 2019-02-23 RX ADMIN — MORPHINE SULFATE PRN MG: 2 INJECTION, SOLUTION INTRAMUSCULAR; INTRAVENOUS at 22:57

## 2019-02-23 NOTE — PROGRESS NOTE
Assessment and Plan


Assessment and plan: 





28-year-old -American male patient with significant history of HIV low 

CD4 count was admitted with fever and chills


 and testicular pain, workup consistent with left epididymitis.ID has evaluated 

adjusted the antibiotics, urology evaluatedat 


* Continue antibiotics per infectious disease


* Continue scrotal elevation and pain management


* Discharge in 1-2 days





Diagnoses


Left epididymitis


Sepsis


HIV/AIDS








-





History


Interval history: 


Continues to complain of scrotal pain


Review of systems


Constitutional: No fevers, no malaise, no joint pains





CVS: No chest pain, no orthopnea, no dyspnea on exertion, no pedal edema





GI: No abdominal pain, no diarrhea, no vomiting, no constipation





Respiratory: No shortness of breath, no wheezing, no coughing





Hospitalist Physical





- Physical exam


Narrative exam: 





General.: Appears well, no distress, nontoxic


HEENT: Moist mucous membranes, extraocular muscles intact, no lymphadenopathy


Neck: supple


Cardiac: S1-S2 heard


Lungs: clear to auscultation bilaterally


Abdomen: soft , nontender,  nondistended,  bowel sounds positive


Extremities: no edema clubbing or cyanosis


Skin: no rash or lesions


Neurologic: no gross focal deficits


Psych: calm, and cooperative





- Constitutional


Vitals: 


                                        











Temp Pulse Resp BP Pulse Ox


 


 99.7 F H  80   18   128/77   98 


 


 02/23/19 12:21  02/23/19 12:21  02/23/19 12:21  02/23/19 12:21  02/23/19 12:21











General appearance: Present: mild distress, well-nourished, other (febrile 

febrile)





Results





- Labs


CBC & Chem 7: 


                                 02/22/19 06:50





                                 02/22/19 06:50


Labs: 


                             Laboratory Last Values











WBC  8.3 K/mm3 (4.5-11.0)   02/22/19  06:50    


 


RBC  4.15 M/mm3 (3.65-5.03)   02/22/19  06:50    


 


Hgb  12.8 gm/dl (11.8-15.2)   02/22/19  06:50    


 


Hct  37.0 % (35.5-45.6)   02/22/19  06:50    


 


MCV  89 fl (84-94)   02/22/19  06:50    


 


MCH  31 pg (28-32)   02/22/19  06:50    


 


MCHC  35 % (32-34)  H  02/22/19  06:50    


 


RDW  12.8 % (13.2-15.2)  L  02/22/19  06:50    


 


Plt Count  212 K/mm3 (140-440)   02/22/19  06:50    


 


Lymph % (Auto)  15.3 % (13.4-35.0)   02/22/19  06:50    


 


Mono % (Auto)  13.8 % (0.0-7.3)  H  02/22/19  06:50    


 


Eos % (Auto)  0.4 % (0.0-4.3)   02/22/19  06:50    


 


Baso % (Auto)  0.1 % (0.0-1.8)   02/22/19  06:50    


 


Lymph #  1.3 K/mm3 (1.2-5.4)   02/22/19  06:50    


 


Mono #  1.1 K/mm3 (0.0-0.8)  H  02/22/19  06:50    


 


Eos #  0.0 K/mm3 (0.0-0.4)   02/22/19  06:50    


 


Baso #  0.0 K/mm3 (0.0-0.1)   02/22/19  06:50    


 


Add Manual Diff  Complete   02/18/19  06:53    


 


Total Counted  100   02/18/19  06:53    


 


Seg Neutrophils %  70.4 % (40.0-70.0)  H  02/22/19  06:50    


 


Seg Neuts % (Manual)  83.0 % (40.0-70.0)  H  02/18/19  06:53    


 


Band Neutrophils %  0 %  02/18/19  06:53    


 


Lymphocytes % (Manual)  13.0 % (13.4-35.0)  L  02/18/19  06:53    


 


Reactive Lymphs % (Man)  0 %  02/18/19  06:53    


 


Monocytes % (Manual)  4.0 % (0.0-7.3)   02/18/19  06:53    


 


Eosinophils % (Manual)  0 % (0.0-4.3)   02/18/19  06:53    


 


Basophils % (Manual)  0 % (0.0-1.8)   02/18/19  06:53    


 


Metamyelocytes %  0 %  02/18/19  06:53    


 


Myelocytes %  0 %  02/18/19  06:53    


 


Promyelocytes %  0 %  02/18/19  06:53    


 


Blast Cells %  0 %  02/18/19  06:53    


 


Nucleated RBC %  Not Reportable   02/18/19  06:53    


 


Seg Neutrophils #  5.8 K/mm3 (1.8-7.7)   02/22/19  06:50    


 


Seg Neutrophils # Man  8.1 K/mm3 (1.8-7.7)  H  02/18/19  06:53    


 


Band Neutrophils #  0.0 K/mm3  02/18/19  06:53    


 


Lymphocytes # (Manual)  1.3 K/mm3 (1.2-5.4)   02/18/19  06:53    


 


Abs React Lymphs (Man)  0.0 K/mm3  02/18/19  06:53    


 


Monocytes # (Manual)  0.4 K/mm3 (0.0-0.8)   02/18/19  06:53    


 


Eosinophils # (Manual)  0.0 K/mm3 (0.0-0.4)   02/18/19  06:53    


 


Basophils # (Manual)  0.0 K/mm3 (0.0-0.1)   02/18/19  06:53    


 


Metamyelocytes #  0.0 K/mm3  02/18/19  06:53    


 


Myelocytes #  0.0 K/mm3  02/18/19  06:53    


 


Promyelocytes #  0.0 K/mm3  02/18/19  06:53    


 


Blast Cells #  0.0 K/mm3  02/18/19  06:53    


 


WBC Morphology  Not Reportable   02/18/19  06:53    


 


Hypersegmented Neuts  Not Reportable   02/18/19  06:53    


 


Hyposegmented Neuts  Not Reportable   02/18/19  06:53    


 


Hypogranular Neuts  Not Reportable   02/18/19  06:53    


 


Smudge Cells  Not Reportable   02/18/19  06:53    


 


Toxic Granulation  Not Reportable   02/18/19  06:53    


 


Toxic Vacuolation  Not Reportable   02/18/19  06:53    


 


Dohle Bodies  Not Reportable   02/18/19  06:53    


 


Pelger-Huet Anomaly  Not Reportable   02/18/19  06:53    


 


Carmine Rods  Not Reportable   02/18/19  06:53    


 


Platelet Estimate  Consistent w auto   02/18/19  06:53    


 


Clumped Platelets  Not Reportable   02/18/19  06:53    


 


Plt Clumps, EDTA  Not Reportable   02/18/19  06:53    


 


Large Platelets  Not Reportable   02/18/19  06:53    


 


Giant Platelets  Not Reportable   02/18/19  06:53    


 


Platelet Satelliting  Not Reportable   02/18/19  06:53    


 


Plt Morphology Comment  Not Reportable   02/18/19  06:53    


 


RBC Morphology  Not Reportable   02/18/19  06:53    


 


Dimorphic RBCs  Not Reportable   02/18/19  06:53    


 


Polychromasia  Not Reportable   02/18/19  06:53    


 


Hypochromasia  Not Reportable   02/18/19  06:53    


 


Poikilocytosis  Not Reportable   02/18/19  06:53    


 


Anisocytosis  1+   02/18/19  06:53    


 


Microcytosis  Not Reportable   02/18/19  06:53    


 


Macrocytosis  Not Reportable   02/18/19  06:53    


 


Spherocytes  Not Reportable   02/18/19  06:53    


 


Pappenheimer Bodies  Not Reportable   02/18/19  06:53    


 


Sickle Cells  Not Reportable   02/18/19  06:53    


 


Target Cells  Not Reportable   02/18/19  06:53    


 


Tear Drop Cells  Not Reportable   02/18/19  06:53    


 


Ovalocytes  Not Reportable   02/18/19  06:53    


 


Helmet Cells  Not Reportable   02/18/19  06:53    


 


[place] Bodies  Not Reportable   02/18/19  06:53    


 


Cabot Rings  Not Reportable   02/18/19  06:53    


 


[place] Cells  Not Reportable   02/18/19  06:53    


 


Bite Cells  Not Reportable   02/18/19  06:53    


 


Crenated Cell  Not Reportable   02/18/19  06:53    


 


Elliptocytes  Not Reportable   02/18/19  06:53    


 


Acanthocytes (Spur)  Not Reportable   02/18/19  06:53    


 


Rouleaux  Not Reportable   02/18/19  06:53    


 


Hemoglobin C Crystals  Not Reportable   02/18/19  06:53    


 


Schistocytes  Not Reportable   02/18/19  06:53    


 


Malaria parasites  Not Reportable   02/18/19  06:53    


 


Agusto Bodies  Not Reportable   02/18/19  06:53    


 


Hem Pathologist Commnt  No   02/18/19  06:53    


 


Sodium  140 mmol/L (137-145)   02/22/19  06:50    


 


Potassium  3.0 mmol/L (3.6-5.0)  L  02/22/19  06:50    


 


Chloride  101.3 mmol/L ()   02/22/19  06:50    


 


Carbon Dioxide  25 mmol/L (22-30)   02/22/19  06:50    


 


Anion Gap  17 mmol/L  02/22/19  06:50    


 


BUN  3 mg/dL (9-20)  L  02/22/19  06:50    


 


Creatinine  0.9 mg/dL (0.8-1.5)   02/22/19  06:50    


 


Estimated GFR  > 60 ml/min  02/22/19  06:50    


 


BUN/Creatinine Ratio  3 %  02/22/19  06:50    


 


Glucose  96 mg/dL ()   02/22/19  06:50    


 


POC Glucose  96  ()   02/18/19  09:02    


 


Lactic Acid  1.10 mmol/L (0.7-2.0)   02/17/19  20:16    


 


Calcium  7.7 mg/dL (8.4-10.2)  L  02/22/19  06:50    


 


Urine Color  Yellow  (Yellow)   02/17/19  21:09    


 


Urine Turbidity  Clear  (Clear)   02/17/19  21:09    


 


Urine pH  5.0  (5.0-7.0)   02/17/19  21:09    


 


Ur Specific Gravity  1.023  (1.003-1.030)   02/17/19  21:09    


 


Urine Protein  100 mg/dl mg/dL (Negative)   02/17/19  21:09    


 


Urine Glucose (UA)  Neg mg/dL (Negative)   02/17/19  21:09    


 


Urine Ketones  Neg mg/dL (Negative)   02/17/19  21:09    


 


Urine Blood  Neg  (Negative)   02/17/19  21:09    


 


Urine Nitrite  Neg  (Negative)   02/17/19  21:09    


 


Urine Bilirubin  Neg  (Negative)   02/17/19  21:09    


 


Urine Urobilinogen  < 2.0 mg/dL (<2.0)   02/17/19  21:09    


 


Ur Leukocyte Esterase  Sm  (Negative)   02/17/19  21:09    


 


Urine WBC (Auto)  24.0 /HPF (0.0-6.0)  H  02/17/19  21:09    


 


Urine RBC (Auto)  3.0 /HPF (0.0-6.0)   02/17/19  21:09    


 


U Epithel Cells (Auto)  < 1.0 /HPF (0-13.0)   02/17/19  21:09    


 


Vancomycin Trough  9.4 ug/mL (5.0-20.0)   02/22/19  15:55    


 


RPR Titer  1:64   02/20/19  14:20    


 


RPR  Reactive  (Nonreactive)   02/20/19  14:20

## 2019-02-24 RX ADMIN — VANCOMYCIN HYDROCHLORIDE SCH MLS/HR: 5 INJECTION, POWDER, LYOPHILIZED, FOR SOLUTION INTRAVENOUS at 08:38

## 2019-02-24 RX ADMIN — MORPHINE SULFATE PRN MG: 2 INJECTION, SOLUTION INTRAMUSCULAR; INTRAVENOUS at 14:22

## 2019-02-24 RX ADMIN — Medication SCH ML: at 09:58

## 2019-02-24 RX ADMIN — ENOXAPARIN SODIUM SCH MG: 100 INJECTION SUBCUTANEOUS at 09:56

## 2019-02-24 RX ADMIN — DOXYCYCLINE SCH MLS/HR: 100 INJECTION, POWDER, LYOPHILIZED, FOR SOLUTION INTRAVENOUS at 21:51

## 2019-02-24 RX ADMIN — DOXYCYCLINE SCH MLS/HR: 100 INJECTION, POWDER, LYOPHILIZED, FOR SOLUTION INTRAVENOUS at 10:21

## 2019-02-24 RX ADMIN — TEMAZEPAM PRN MG: 15 CAPSULE ORAL at 21:52

## 2019-02-24 RX ADMIN — MORPHINE SULFATE PRN MG: 2 INJECTION, SOLUTION INTRAMUSCULAR; INTRAVENOUS at 05:34

## 2019-02-24 RX ADMIN — ACETAMINOPHEN PRN MG: 325 TABLET ORAL at 18:42

## 2019-02-24 RX ADMIN — VANCOMYCIN HYDROCHLORIDE SCH MLS/HR: 5 INJECTION, POWDER, LYOPHILIZED, FOR SOLUTION INTRAVENOUS at 17:07

## 2019-02-24 RX ADMIN — VANCOMYCIN HYDROCHLORIDE SCH MLS/HR: 5 INJECTION, POWDER, LYOPHILIZED, FOR SOLUTION INTRAVENOUS at 00:49

## 2019-02-24 RX ADMIN — Medication SCH ML: at 23:13

## 2019-02-24 RX ADMIN — VANCOMYCIN HYDROCHLORIDE SCH MLS/HR: 5 INJECTION, POWDER, LYOPHILIZED, FOR SOLUTION INTRAVENOUS at 21:52

## 2019-02-24 RX ADMIN — LEVOFLOXACIN SCH MG: 750 TABLET, FILM COATED ORAL at 09:56

## 2019-02-24 RX ADMIN — SODIUM CHLORIDE SCH MLS/HR: 0.9 INJECTION, SOLUTION INTRAVENOUS at 10:20

## 2019-02-24 RX ADMIN — MORPHINE SULFATE PRN MG: 2 INJECTION, SOLUTION INTRAMUSCULAR; INTRAVENOUS at 09:54

## 2019-02-24 RX ADMIN — ONDANSETRON PRN MG: 2 INJECTION INTRAMUSCULAR; INTRAVENOUS at 21:56

## 2019-02-24 RX ADMIN — CEFTRIAXONE SODIUM SCH MLS/HR: 2 INJECTION, POWDER, FOR SOLUTION INTRAMUSCULAR; INTRAVENOUS at 10:21

## 2019-02-24 RX ADMIN — MORPHINE SULFATE PRN MG: 2 INJECTION, SOLUTION INTRAMUSCULAR; INTRAVENOUS at 21:56

## 2019-02-24 NOTE — PROGRESS NOTE
Assessment and Plan


Assessment and plan: 





28-year-old -American male patient with significant history of HIV low 

CD4 count was admitted with fever and chills


 and testicular pain, workup consistent with left epididymitis.ID has evaluated 

adjusted the antibiotics, urology eval done who agreed with plan


* Continue antibiotics per infectious disease


* Continue scrotal elevation and pain management


* Discharge in 1-2 days





Diagnoses


Left epididymitis


Sepsis


HIV/AIDS








-





History


Interval history: 


Continues to complain of scrotal pain


Review of systems


Constitutional: No fevers, no malaise, no joint pains





CVS: No chest pain, no orthopnea, no dyspnea on exertion, no pedal edema





GI: No abdominal pain, no diarrhea, no vomiting, no constipation





Respiratory: No shortness of breath, no wheezing, no coughing





Hospitalist Physical





- Physical exam


Narrative exam: 





General.: Appears well, no distress, nontoxic


HEENT: Moist mucous membranes, extraocular muscles intact, no lymphadenopathy


Neck: supple


Cardiac: S1-S2 heard


Lungs: clear to auscultation bilaterally


Abdomen: soft , nontender,  nondistended,  bowel sounds positive


Extremities: no edema clubbing or cyanosis


Skin: no rash or lesions


Neurologic: no gross focal deficits


Psych: calm, and cooperative





- Constitutional


Vitals: 


                                        











Temp Pulse Resp BP Pulse Ox


 


 99.5 F   85   20   121/102   96 


 


 02/24/19 11:51  02/24/19 11:51  02/24/19 11:51  02/24/19 11:51  02/24/19 11:51











General appearance: Present: mild distress, well-nourished, other (febrile 

febrile)





Results





- Labs


CBC & Chem 7: 


                                 02/25/19 10:13





                                 02/22/19 06:50


Labs: 


                             Laboratory Last Values











WBC  8.3 K/mm3 (4.5-11.0)   02/22/19  06:50    


 


RBC  4.15 M/mm3 (3.65-5.03)   02/22/19  06:50    


 


Hgb  12.8 gm/dl (11.8-15.2)   02/22/19  06:50    


 


Hct  37.0 % (35.5-45.6)   02/22/19  06:50    


 


MCV  89 fl (84-94)   02/22/19  06:50    


 


MCH  31 pg (28-32)   02/22/19  06:50    


 


MCHC  35 % (32-34)  H  02/22/19  06:50    


 


RDW  12.8 % (13.2-15.2)  L  02/22/19  06:50    


 


Plt Count  212 K/mm3 (140-440)   02/22/19  06:50    


 


Lymph % (Auto)  15.3 % (13.4-35.0)   02/22/19  06:50    


 


Mono % (Auto)  13.8 % (0.0-7.3)  H  02/22/19  06:50    


 


Eos % (Auto)  0.4 % (0.0-4.3)   02/22/19  06:50    


 


Baso % (Auto)  0.1 % (0.0-1.8)   02/22/19  06:50    


 


Lymph #  1.3 K/mm3 (1.2-5.4)   02/22/19  06:50    


 


Mono #  1.1 K/mm3 (0.0-0.8)  H  02/22/19  06:50    


 


Eos #  0.0 K/mm3 (0.0-0.4)   02/22/19  06:50    


 


Baso #  0.0 K/mm3 (0.0-0.1)   02/22/19  06:50    


 


Add Manual Diff  Complete   02/18/19  06:53    


 


Total Counted  100   02/18/19  06:53    


 


Seg Neutrophils %  70.4 % (40.0-70.0)  H  02/22/19  06:50    


 


Seg Neuts % (Manual)  83.0 % (40.0-70.0)  H  02/18/19  06:53    


 


Band Neutrophils %  0 %  02/18/19  06:53    


 


Lymphocytes % (Manual)  13.0 % (13.4-35.0)  L  02/18/19  06:53    


 


Reactive Lymphs % (Man)  0 %  02/18/19  06:53    


 


Monocytes % (Manual)  4.0 % (0.0-7.3)   02/18/19  06:53    


 


Eosinophils % (Manual)  0 % (0.0-4.3)   02/18/19  06:53    


 


Basophils % (Manual)  0 % (0.0-1.8)   02/18/19  06:53    


 


Metamyelocytes %  0 %  02/18/19  06:53    


 


Myelocytes %  0 %  02/18/19  06:53    


 


Promyelocytes %  0 %  02/18/19  06:53    


 


Blast Cells %  0 %  02/18/19  06:53    


 


Nucleated RBC %  Not Reportable   02/18/19  06:53    


 


Seg Neutrophils #  5.8 K/mm3 (1.8-7.7)   02/22/19  06:50    


 


Seg Neutrophils # Man  8.1 K/mm3 (1.8-7.7)  H  02/18/19  06:53    


 


Band Neutrophils #  0.0 K/mm3  02/18/19  06:53    


 


Lymphocytes # (Manual)  1.3 K/mm3 (1.2-5.4)   02/18/19  06:53    


 


Abs React Lymphs (Man)  0.0 K/mm3  02/18/19  06:53    


 


Monocytes # (Manual)  0.4 K/mm3 (0.0-0.8)   02/18/19  06:53    


 


Eosinophils # (Manual)  0.0 K/mm3 (0.0-0.4)   02/18/19  06:53    


 


Basophils # (Manual)  0.0 K/mm3 (0.0-0.1)   02/18/19  06:53    


 


Metamyelocytes #  0.0 K/mm3  02/18/19  06:53    


 


Myelocytes #  0.0 K/mm3  02/18/19  06:53    


 


Promyelocytes #  0.0 K/mm3  02/18/19  06:53    


 


Blast Cells #  0.0 K/mm3  02/18/19  06:53    


 


WBC Morphology  Not Reportable   02/18/19  06:53    


 


Hypersegmented Neuts  Not Reportable   02/18/19  06:53    


 


Hyposegmented Neuts  Not Reportable   02/18/19  06:53    


 


Hypogranular Neuts  Not Reportable   02/18/19  06:53    


 


Smudge Cells  Not Reportable   02/18/19  06:53    


 


Toxic Granulation  Not Reportable   02/18/19  06:53    


 


Toxic Vacuolation  Not Reportable   02/18/19  06:53    


 


Dohle Bodies  Not Reportable   02/18/19  06:53    


 


Pelger-Huet Anomaly  Not Reportable   02/18/19  06:53    


 


Carmine Rods  Not Reportable   02/18/19  06:53    


 


Platelet Estimate  Consistent w auto   02/18/19  06:53    


 


Clumped Platelets  Not Reportable   02/18/19  06:53    


 


Plt Clumps, EDTA  Not Reportable   02/18/19  06:53    


 


Large Platelets  Not Reportable   02/18/19  06:53    


 


Giant Platelets  Not Reportable   02/18/19  06:53    


 


Platelet Satelliting  Not Reportable   02/18/19  06:53    


 


Plt Morphology Comment  Not Reportable   02/18/19  06:53    


 


RBC Morphology  Not Reportable   02/18/19  06:53    


 


Dimorphic RBCs  Not Reportable   02/18/19  06:53    


 


Polychromasia  Not Reportable   02/18/19  06:53    


 


Hypochromasia  Not Reportable   02/18/19  06:53    


 


Poikilocytosis  Not Reportable   02/18/19  06:53    


 


Anisocytosis  1+   02/18/19  06:53    


 


Microcytosis  Not Reportable   02/18/19  06:53    


 


Macrocytosis  Not Reportable   02/18/19  06:53    


 


Spherocytes  Not Reportable   02/18/19  06:53    


 


Pappenheimer Bodies  Not Reportable   02/18/19  06:53    


 


Sickle Cells  Not Reportable   02/18/19  06:53    


 


Target Cells  Not Reportable   02/18/19  06:53    


 


Tear Drop Cells  Not Reportable   02/18/19  06:53    


 


Ovalocytes  Not Reportable   02/18/19  06:53    


 


Helmet Cells  Not Reportable   02/18/19  06:53    


 


[place] Bodies  Not Reportable   02/18/19  06:53    


 


Cabot Rings  Not Reportable   02/18/19  06:53    


 


[place] Cells  Not Reportable   02/18/19  06:53    


 


Bite Cells  Not Reportable   02/18/19  06:53    


 


Crenated Cell  Not Reportable   02/18/19  06:53    


 


Elliptocytes  Not Reportable   02/18/19  06:53    


 


Acanthocytes (Spur)  Not Reportable   02/18/19  06:53    


 


Rouleaux  Not Reportable   02/18/19  06:53    


 


Hemoglobin C Crystals  Not Reportable   02/18/19  06:53    


 


Schistocytes  Not Reportable   02/18/19  06:53    


 


Malaria parasites  Not Reportable   02/18/19  06:53    


 


Agusto Bodies  Not Reportable   02/18/19  06:53    


 


Hem Pathologist Commnt  No   02/18/19  06:53    


 


Sodium  140 mmol/L (137-145)   02/22/19  06:50    


 


Potassium  3.0 mmol/L (3.6-5.0)  L  02/22/19  06:50    


 


Chloride  101.3 mmol/L ()   02/22/19  06:50    


 


Carbon Dioxide  25 mmol/L (22-30)   02/22/19  06:50    


 


Anion Gap  17 mmol/L  02/22/19  06:50    


 


BUN  3 mg/dL (9-20)  L  02/22/19  06:50    


 


Creatinine  0.9 mg/dL (0.8-1.5)   02/22/19  06:50    


 


Estimated GFR  > 60 ml/min  02/22/19  06:50    


 


BUN/Creatinine Ratio  3 %  02/22/19  06:50    


 


Glucose  96 mg/dL ()   02/22/19  06:50    


 


POC Glucose  96  ()   02/18/19  09:02    


 


Lactic Acid  1.10 mmol/L (0.7-2.0)   02/17/19  20:16    


 


Calcium  7.7 mg/dL (8.4-10.2)  L  02/22/19  06:50    


 


Urine Color  Yellow  (Yellow)   02/17/19  21:09    


 


Urine Turbidity  Clear  (Clear)   02/17/19  21:09    


 


Urine pH  5.0  (5.0-7.0)   02/17/19  21:09    


 


Ur Specific Gravity  1.023  (1.003-1.030)   02/17/19  21:09    


 


Urine Protein  100 mg/dl mg/dL (Negative)   02/17/19  21:09    


 


Urine Glucose (UA)  Neg mg/dL (Negative)   02/17/19  21:09    


 


Urine Ketones  Neg mg/dL (Negative)   02/17/19  21:09    


 


Urine Blood  Neg  (Negative)   02/17/19  21:09    


 


Urine Nitrite  Neg  (Negative)   02/17/19  21:09    


 


Urine Bilirubin  Neg  (Negative)   02/17/19  21:09    


 


Urine Urobilinogen  < 2.0 mg/dL (<2.0)   02/17/19  21:09    


 


Ur Leukocyte Esterase  Sm  (Negative)   02/17/19  21:09    


 


Urine WBC (Auto)  24.0 /HPF (0.0-6.0)  H  02/17/19  21:09    


 


Urine RBC (Auto)  3.0 /HPF (0.0-6.0)   02/17/19  21:09    


 


U Epithel Cells (Auto)  < 1.0 /HPF (0-13.0)   02/17/19  21:09    


 


Vancomycin Trough  9.4 ug/mL (5.0-20.0)   02/22/19  15:55    


 


RPR Titer  1:64   02/20/19  14:20    


 


RPR  Reactive  (Nonreactive)   02/20/19  14:20    


 


T.pallidum Ab (FTA-ABS)  Reactive  (Nonreactive)  H  02/21/19  14:20

## 2019-02-24 NOTE — PROGRESS NOTE
Assessment and Plan





Cultures:


02/18/2019 blood culture: No growth


02/18/2019 urine culture: No growth





A/P:


28-year-old male with HIV:





#1 Left epididymitis: Improving. . Does report a history of chlamydia and 

gonorrhea in the past but has not been sexually active in the last 1 year or so.

We will increase ceftriaxone dose to 2 g every 24 hours and also add 

doxycycline. Discontinue levofloxacin. Given worsening in spite of antibiotics, 

we repeat a stat left testicular ultrasound  Per Dr. Najera, testicular US 

findings are due to increased inflammation, no role for surgery at this time.





          Repeat testicular US -  Interval change in left testicular blood flow 

pattern with high resistance flow now suspected, possibly secondary to worsening

 inflammation/epididymitis.  Small complex left hydrocele and left  scrotal 

edema. 


         -RPR - reactive (treated January, 2019)





#2 HIV: Reportedly well controlled. On Descovy, Prezcobix and Tivicay, taking 

his own supply.





#3 Fevers: continuing to spike fevers, continue ceftriaxone, doxycycline, 

vancomycin. Add Levofloxacin PO. 





Recs:


 f/u Gonorrhea Chlamydia NAAT sample


 Continue increased dose of  Ceftriaxone, 2 gm daily, D5 of D7


Continue Doxycycline, D5 of D10


Discontiued  Vancomycin 1 gm IV q 8H


Continue  Levofloxacin 750mg PO every 24 hours, D3 of D10








[facility] 


M: [id number]


O:[phone]





Subjective


Date of service: 02/24/19


Interval history: 





Patient seen and examined.  Stated that his testicular pain and swelling is im

proved. Fevers continuing.  Nurses notes, labs, reports reviewed, discussed with

patient.  





Objective





- Exam


Narrative Exam: 





Constitutional: Alert, cooperative. Testicular pain improving


Head, Ears, Nose: Normocephalic, atraumatic. External ears, nose normal


Eyes: Conjunctivae/corneas clear. No icterus. No ptosis.


Neck: Supple, no meningeal signs


Oral: dentition fair, no thrush


Cardiovascular: S1, S2 normal. 


Respiratory: Good air entry, clear to auscultation bilaterally


GI: Soft, non-tender; bowel sounds normal. No peritoneal signs


: left testicle with significant swelling, firm and tender- improving.  No pen

ile lesions


Musculoskeletal: No pedal edema, no cyanosis.


Skin: No rash or abscess


Hem/Lymphatic: No palpable cervical or supraclavicular nodes. No lymphangitis


Psych: Mood ok. Affect normal


Neurological: Awake, alert, oriented. No gross abnormality








- Constitutional


Vitals: 


                                   Vital Signs











Temp Pulse Resp BP Pulse Ox


 


 99.5 F   74   16   132/78   98 


 


 02/24/19 05:34  02/24/19 05:34  02/24/19 05:34  02/24/19 05:34  02/24/19 05:34








                           Temperature -Last 24 Hours











Temperature                    99.5 F


 


Temperature                    99.7 F


 


Temperature                    100.8 F


 


Temperature                    99.7 F

















- Labs


CBC & Chem 7: 


                                 02/22/19 06:50





                                 02/22/19 06:50


Labs: 


                              Abnormal lab results











  02/21/19 Range/Units





  14:20 


 


T.pallidum Ab (FTA-ABS)  Reactive H  (Nonreactive)

## 2019-02-25 LAB
BASOPHILS # (AUTO): 0 K/MM3 (ref 0–0.1)
BASOPHILS NFR BLD AUTO: 0.2 % (ref 0–1.8)
EOSINOPHIL # BLD AUTO: 0.1 K/MM3 (ref 0–0.4)
EOSINOPHIL NFR BLD AUTO: 0.8 % (ref 0–4.3)
HCT VFR BLD CALC: 34.8 % (ref 35.5–45.6)
HGB BLD-MCNC: 12.2 GM/DL (ref 11.8–15.2)
LYMPHOCYTES # BLD AUTO: 1.4 K/MM3 (ref 1.2–5.4)
LYMPHOCYTES NFR BLD AUTO: 20.4 % (ref 13.4–35)
MCHC RBC AUTO-ENTMCNC: 35 % (ref 32–34)
MCV RBC AUTO: 89 FL (ref 84–94)
MONOCYTES # (AUTO): 0.7 K/MM3 (ref 0–0.8)
MONOCYTES % (AUTO): 10.6 % (ref 0–7.3)
PLATELET # BLD: 358 K/MM3 (ref 140–440)
RBC # BLD AUTO: 3.89 M/MM3 (ref 3.65–5.03)

## 2019-02-25 RX ADMIN — MORPHINE SULFATE PRN MG: 2 INJECTION, SOLUTION INTRAMUSCULAR; INTRAVENOUS at 18:49

## 2019-02-25 RX ADMIN — VANCOMYCIN HYDROCHLORIDE SCH MLS/HR: 5 INJECTION, POWDER, LYOPHILIZED, FOR SOLUTION INTRAVENOUS at 10:48

## 2019-02-25 RX ADMIN — DOXYCYCLINE SCH MLS/HR: 100 INJECTION, POWDER, LYOPHILIZED, FOR SOLUTION INTRAVENOUS at 21:39

## 2019-02-25 RX ADMIN — ENOXAPARIN SODIUM SCH MG: 100 INJECTION SUBCUTANEOUS at 10:48

## 2019-02-25 RX ADMIN — DOXYCYCLINE SCH MLS/HR: 100 INJECTION, POWDER, LYOPHILIZED, FOR SOLUTION INTRAVENOUS at 12:21

## 2019-02-25 RX ADMIN — LEVOFLOXACIN SCH MG: 750 TABLET, FILM COATED ORAL at 10:49

## 2019-02-25 RX ADMIN — MORPHINE SULFATE PRN MG: 2 INJECTION, SOLUTION INTRAMUSCULAR; INTRAVENOUS at 23:17

## 2019-02-25 RX ADMIN — MORPHINE SULFATE PRN MG: 2 INJECTION, SOLUTION INTRAMUSCULAR; INTRAVENOUS at 12:26

## 2019-02-25 RX ADMIN — ONDANSETRON PRN MG: 2 INJECTION INTRAMUSCULAR; INTRAVENOUS at 05:32

## 2019-02-25 RX ADMIN — Medication SCH ML: at 10:51

## 2019-02-25 RX ADMIN — TEMAZEPAM PRN MG: 15 CAPSULE ORAL at 23:17

## 2019-02-25 RX ADMIN — Medication SCH ML: at 21:39

## 2019-02-25 RX ADMIN — MORPHINE SULFATE PRN MG: 2 INJECTION, SOLUTION INTRAMUSCULAR; INTRAVENOUS at 05:32

## 2019-02-25 RX ADMIN — CEFTRIAXONE SODIUM SCH MLS/HR: 2 INJECTION, POWDER, FOR SOLUTION INTRAMUSCULAR; INTRAVENOUS at 15:01

## 2019-02-25 RX ADMIN — VANCOMYCIN HYDROCHLORIDE SCH MLS/HR: 5 INJECTION, POWDER, LYOPHILIZED, FOR SOLUTION INTRAVENOUS at 18:55

## 2019-02-25 RX ADMIN — VANCOMYCIN HYDROCHLORIDE SCH: 5 INJECTION, POWDER, LYOPHILIZED, FOR SOLUTION INTRAVENOUS at 01:51

## 2019-02-25 NOTE — PROGRESS NOTE
Assessment and Plan





Cultures:


02/18/2019 blood culture: No growth


02/18/2019 urine culture: No growth





A/P:


28-year-old male with HIV:





#1 Left epididymitis: Improving. . Does report a history of chlamydia and 

gonorrhea in the past but has not been sexually active in the last 1 year or so.

We will increase ceftriaxone dose to 2 g every 24 hours and also add 

doxycycline. Discontinue levofloxacin. Given worsening in spite of antibiotics, 

we repeat a stat left testicular ultrasound  Per Dr. Najera, testicular US findi

ngs are due to increased inflammation, no role for surgery at this time.





          Repeat testicular US -  Interval change in left testicular blood flow 

pattern with high resistance flow now suspected, possibly secondary to worsening

 inflammation/epididymitis.  Small complex left hydrocele and left  scrotal 

edema. 


         -RPR - reactive (treated January, 2019)





#2 HIV: Reportedly well controlled. On Descovy, Prezcobix and Tivicay, taking 

his own supply.





#3 Fevers: continuing to spike fevers, continue ceftriaxone, doxycycline and 

Levofloxacin. 





Recs:


 f/u Gonorrhea Chlamydia NAAT sample


 Continue increased dose of  Ceftriaxone, 2 gm daily, D6 of D7


Continue Doxycycline, D6 of D10


Continue  Levofloxacin 750mg PO every 24 hours, D4 of D10








[facility] 


M: [id number]


O:[phone]





Subjective


Date of service: 02/25/19


Interval history: 





Patient seen and examined.  Stated that his testicular pain and swelling is 

improved. Fevers continuing. Discussion regarding follow up with urologist after

discharge, verbalized understanding.  





Objective





- Exam


Narrative Exam: 





Constitutional: Alert, cooperative. Testicular pain improving


Head, Ears, Nose: Normocephalic, atraumatic. External ears, nose normal


Eyes: Conjunctivae/corneas clear. No icterus. No ptosis.


Neck: Supple, no meningeal signs


Oral: dentition fair, no thrush


Cardiovascular: S1, S2 normal. 


Respiratory: Good air entry, clear to auscultation bilaterally


GI: Soft, non-tender; bowel sounds normal. No peritoneal signs


: left testicle with significant swelling, firm and tender- improving.  No 

penile lesions


Musculoskeletal: No pedal edema, no cyanosis.


Skin: No rash or abscess


Hem/Lymphatic: No palpable cervical or supraclavicular nodes. No lymphangitis


Psych: Mood ok. Affect normal


Neurological: Awake, alert, oriented. No gross abnormality








- Constitutional


Vitals: 


                                   Vital Signs











Temp Pulse Resp BP Pulse Ox


 


 98.3 F   75   18   132/82   99 


 


 02/25/19 04:56  02/25/19 04:56  02/25/19 04:56  02/25/19 04:56  02/25/19 04:56








                           Temperature -Last 24 Hours











Temperature                    98.3 F


 


Temperature                    98.5 F


 


Temperature                    98.5 F


 


Temperature                    100.5 F


 


Temperature                    99.5 F

















- Labs


CBC & Chem 7: 


                                 02/25/19 10:13





                                 02/22/19 06:50

## 2019-02-25 NOTE — PROGRESS NOTE
Assessment and Plan


Assessment and plan: 





28-year-old -American male patient with significant history of HIV low 

CD4 count was admitted with fever and chills


 and testicular pain, workup consistent with left epididymitis.ID has evaluated 

adjusted the antibiotics, urology eval done who agreed with plan


* Continue antibiotics per infectious disease


* Continue scrotal elevation and pain management


* Discharge in 1-2 days





Diagnoses


Left epididymitis


Sepsis


HIV/AIDS








-





History


Interval history: 


Continues to complain of scrotal pain


Review of systems


Constitutional: No fevers, no malaise, no joint pains





CVS: No chest pain, no orthopnea, no dyspnea on exertion, no pedal edema





GI: No abdominal pain, no diarrhea, no vomiting, no constipation





Respiratory: No shortness of breath, no wheezing, no coughing





Hospitalist Physical





- Physical exam


Narrative exam: 





General.: Appears well, no distress, nontoxic


HEENT: Moist mucous membranes, extraocular muscles intact, no lymphadenopathy


Neck: supple


Cardiac: S1-S2 heard


Lungs: clear to auscultation bilaterally


Abdomen: soft , nontender,  nondistended,  bowel sounds positive


Extremities: no edema clubbing or cyanosis


Skin: no rash or lesions


Neurologic: no gross focal deficits


Psych: calm, and cooperative





- Constitutional


Vitals: 


                                        











Temp Pulse Resp BP Pulse Ox


 


 99.8 F H  84   16   134/78   98 


 


 19 12:25  19 12:25  19 12:25  19 12:25  19 12:25











General appearance: Present: mild distress, well-nourished, other (febrile 

febrile)





Results





- Labs


CBC & Chem 7: 


                                 19 10:13





                                 19 06:50


Labs: 


                             Laboratory Last Values











WBC  6.7 K/mm3 (4.5-11.0)   19  10:13    


 


RBC  3.89 M/mm3 (3.65-5.03)   19  10:13    


 


Hgb  12.2 gm/dl (11.8-15.2)   19  10:13    


 


Hct  34.8 % (35.5-45.6)  L  19  10:13    


 


MCV  89 fl (84-94)   19  10:13    


 


MCH  32 pg (28-32)   19  10:13    


 


MCHC  35 % (32-34)  H  19  10:13    


 


RDW  13.1 % (13.2-15.2)  L  19  10:13    


 


Plt Count  358 K/mm3 (140-440)   19  10:13    


 


Lymph % (Auto)  20.4 % (13.4-35.0)   19  10:13    


 


Mono % (Auto)  10.6 % (0.0-7.3)  H  19  10:13    


 


Eos % (Auto)  0.8 % (0.0-4.3)   19  10:13    


 


Baso % (Auto)  0.2 % (0.0-1.8)   19  10:13    


 


Lymph #  1.4 K/mm3 (1.2-5.4)   19  10:13    


 


Mono #  0.7 K/mm3 (0.0-0.8)   19  10:13    


 


Eos #  0.1 K/mm3 (0.0-0.4)   19  10:13    


 


Baso #  0.0 K/mm3 (0.0-0.1)   19  10:13    


 


Add Manual Diff  Complete   19  06:53    


 


Total Counted  100   19  06:53    


 


Seg Neutrophils %  68.0 % (40.0-70.0)   19  10:13    


 


Seg Neuts % (Manual)  83.0 % (40.0-70.0)  H  19  06:53    


 


Band Neutrophils %  0 %  19  06:53    


 


Lymphocytes % (Manual)  13.0 % (13.4-35.0)  L  19  06:53    


 


Reactive Lymphs % (Man)  0 %  19  06:53    


 


Monocytes % (Manual)  4.0 % (0.0-7.3)   19  06:53    


 


Eosinophils % (Manual)  0 % (0.0-4.3)   19  06:53    


 


Basophils % (Manual)  0 % (0.0-1.8)   19  06:53    


 


Metamyelocytes %  0 %  19  06:53    


 


Myelocytes %  0 %  19  06:53    


 


Promyelocytes %  0 %  19  06:53    


 


Blast Cells %  0 %  19  06:53    


 


Nucleated RBC %  Not Reportable   19  06:53    


 


Seg Neutrophils #  4.6 K/mm3 (1.8-7.7)   19  10:13    


 


Seg Neutrophils # Man  8.1 K/mm3 (1.8-7.7)  H  19  06:53    


 


Band Neutrophils #  0.0 K/mm3  19  06:53    


 


Lymphocytes # (Manual)  1.3 K/mm3 (1.2-5.4)   19  06:53    


 


Abs React Lymphs (Man)  0.0 K/mm3  19  06:53    


 


Monocytes # (Manual)  0.4 K/mm3 (0.0-0.8)   19  06:53    


 


Eosinophils # (Manual)  0.0 K/mm3 (0.0-0.4)   19  06:53    


 


Basophils # (Manual)  0.0 K/mm3 (0.0-0.1)   19  06:53    


 


Metamyelocytes #  0.0 K/mm3  19  06:53    


 


Myelocytes #  0.0 K/mm3  19  06:53    


 


Promyelocytes #  0.0 K/mm3  19  06:53    


 


Blast Cells #  0.0 K/mm3  19  06:53    


 


WBC Morphology  Not Reportable   19  06:53    


 


Hypersegmented Neuts  Not Reportable   19  06:53    


 


Hyposegmented Neuts  Not Reportable   19  06:53    


 


Hypogranular Neuts  Not Reportable   19  06:53    


 


Smudge Cells  Not Reportable   19  06:53    


 


Toxic Granulation  Not Reportable   19  06:53    


 


Toxic Vacuolation  Not Reportable   19  06:53    


 


Dohle Bodies  Not Reportable   19  06:53    


 


Pelger-Huet Anomaly  Not Reportable   19  06:53    


 


Carmine Rods  Not Reportable   19  06:53    


 


Platelet Estimate  Consistent w auto   19  06:53    


 


Clumped Platelets  Not Reportable   19  06:53    


 


Plt Clumps, EDTA  Not Reportable   19  06:53    


 


Large Platelets  Not Reportable   19  06:53    


 


Giant Platelets  Not Reportable   19  06:53    


 


Platelet Satelliting  Not Reportable   19  06:53    


 


Plt Morphology Comment  Not Reportable   19  06:53    


 


RBC Morphology  Not Reportable   19  06:53    


 


Dimorphic RBCs  Not Reportable   19  06:53    


 


Polychromasia  Not Reportable   19  06:53    


 


Hypochromasia  Not Reportable   19  06:53    


 


Poikilocytosis  Not Reportable   19  06:53    


 


Anisocytosis  1+   19  06:53    


 


Microcytosis  Not Reportable   19  06:53    


 


Macrocytosis  Not Reportable   19  06:53    


 


Spherocytes  Not Reportable   19  06:53    


 


Pappenheimer Bodies  Not Reportable   19  06:53    


 


Sickle Cells  Not Reportable   19  06:53    


 


Target Cells  Not Reportable   19  06:53    


 


Tear Drop Cells  Not Reportable   19  06:53    


 


Ovalocytes  Not Reportable   19  06:53    


 


Helmet Cells  Not Reportable   19  06:53    


 


Corona-Adwolf Bodies  Not Reportable   19  06:53    


 


Cabot Rings  Not Reportable   19  06:53    


 


[place] Cells  Not Reportable   19  06:53    


 


Bite Cells  Not Reportable   19  06:53    


 


Crenated Cell  Not Reportable   19  06:53    


 


Elliptocytes  Not Reportable   19  06:53    


 


Acanthocytes (Spur)  Not Reportable   19  06:53    


 


Rouleaux  Not Reportable   19  06:53    


 


Hemoglobin C Crystals  Not Reportable   19  06:53    


 


Schistocytes  Not Reportable   19  06:53    


 


Malaria parasites  Not Reportable   19  06:53    


 


Agusto Bodies  Not Reportable   19  06:53    


 


Hem Pathologist Commnt  No   19  06:53    


 


Sodium  140 mmol/L (137-145)   19  06:50    


 


Potassium  3.0 mmol/L (3.6-5.0)  L  19  06:50    


 


Chloride  101.3 mmol/L ()   19  06:50    


 


Carbon Dioxide  25 mmol/L (22-30)   19  06:50    


 


Anion Gap  17 mmol/L  19  06:50    


 


BUN  3 mg/dL (9-20)  L  19  06:50    


 


Creatinine  0.9 mg/dL (0.8-1.5)   19  06:50    


 


Estimated GFR  > 60 ml/min  19  06:50    


 


BUN/Creatinine Ratio  3 %  19  06:50    


 


Glucose  96 mg/dL ()   19  06:50    


 


POC Glucose  96  ()   19  09:02    


 


Lactic Acid  1.10 mmol/L (0.7-2.0)   19  20:16    


 


Calcium  7.7 mg/dL (8.4-10.2)  L  19  06:50    


 


Urine Color  Yellow  (Yellow)   19  21:09    


 


Urine Turbidity  Clear  (Clear)   19  21:09    


 


Urine pH  5.0  (5.0-7.0)   19  21:09    


 


Ur Specific Gravity  1.023  (1.003-1.030)   19  21:09    


 


Urine Protein  100 mg/dl mg/dL (Negative)   19  21:09    


 


Urine Glucose (UA)  Neg mg/dL (Negative)   19  21:09    


 


Urine Ketones  Neg mg/dL (Negative)   19  21:09    


 


Urine Blood  Neg  (Negative)   19  21:09    


 


Urine Nitrite  Neg  (Negative)   19  21:09    


 


Urine Bilirubin  Neg  (Negative)   19  21:09    


 


Urine Urobilinogen  < 2.0 mg/dL (<2.0)   19  21:09    


 


Ur Leukocyte Esterase  Sm  (Negative)   19  21:09    


 


Urine WBC (Auto)  24.0 /HPF (0.0-6.0)  H  19  21:09    


 


Urine RBC (Auto)  3.0 /HPF (0.0-6.0)   19  21:09    


 


U Epithel Cells (Auto)  < 1.0 /HPF (0-13.0)   19  21:09    


 


Vancomycin Trough  9.4 ug/mL (5.0-20.0)   19  15:55    


 


RPR Titer  1:64   19  14:20    


 


RPR  Reactive  (Nonreactive)   19  14:20    


 


T.pallidum Ab (FTA-ABS)  Reactive  (Nonreactive)  H  19  14:20    














Nutrition/Malnutrition Assess





- Dietary Evaluation


Nutrition/Malnutrition Findings: 


                                        





Nutrition Notes                                            Start:  19 08:

56


Freq:                                                      Status: Active       




Protocol:                                                                       




 Document     19 08:56  VARGAS  (Rec: 19 09:01  VARGAS  SRW-

FNSERVICES1)


 Nutrition Notes


     Need for Assessment generated from:         LOS


     Initial or Follow up                        Brief Note


     Current Diagnosis                           Sepsis


     Other Pertinent Diagnosis                   HIV, (L) epididymitis


     Current Diet                                Regular


     Labs/Tests                                  Reviewed


     Pertinent Medications                       Reviewed


     Height                                      5 ft 9 in


     Weight                                      74.2 kg


     Usual Body Weight                           70.45 kg


     Ideal Body Weight (kg)                      72.72


     BMI                                         24.1


     Weight Status                               Appropriate


     Subjective/Other Information                Pt screened for LOS.  He


                                                 reports that his appetite is "


                                                 coming back"; requests double


                                                 portions instead of ONS.


     Percent of energy/protein needs met:        69% energy


                                                 96% pro


     Is Patient Ambulatory and/or Out of Bed     Yes


     REE-(Obion-St. Jeor-ambulatory/OOB) [     2213.094


      [email or web address]]                               


     Calculation Used for Recommendations        Obion-St Jeor


     Additional Notes                            Pro needs 0.8-1g/k-74g/


                                                 day


                                                 Fluid needs 1ml/kcal


 Nutrition Intervention


     Revisit per MD consult or patient           Sign Off


      request:

## 2019-02-26 VITALS — DIASTOLIC BLOOD PRESSURE: 88 MMHG | SYSTOLIC BLOOD PRESSURE: 132 MMHG

## 2019-02-26 RX ADMIN — ENOXAPARIN SODIUM SCH MG: 100 INJECTION SUBCUTANEOUS at 10:31

## 2019-02-26 RX ADMIN — LEVOFLOXACIN SCH MG: 750 TABLET, FILM COATED ORAL at 10:31

## 2019-02-26 RX ADMIN — DOXYCYCLINE SCH MLS/HR: 100 INJECTION, POWDER, LYOPHILIZED, FOR SOLUTION INTRAVENOUS at 10:32

## 2019-02-26 RX ADMIN — MORPHINE SULFATE PRN MG: 2 INJECTION, SOLUTION INTRAMUSCULAR; INTRAVENOUS at 09:00

## 2019-02-26 RX ADMIN — VANCOMYCIN HYDROCHLORIDE SCH MLS/HR: 5 INJECTION, POWDER, LYOPHILIZED, FOR SOLUTION INTRAVENOUS at 01:38

## 2019-02-26 RX ADMIN — Medication SCH ML: at 10:38

## 2019-02-26 NOTE — DISCHARGE SUMMARY
Providers





- Providers


Date of Admission: 


02/18/19 03:23





Attending physician: 


STEPHON FULLER MD





                                        





02/19/19 14:54


Consult to Physician [CONS] Routine 


   Comment: 


   Consulting Provider: DEYSI SCOTT


   Physician Instructions: 


   Reason For Exam: Lt.Epididymitis/Fever











Primary care physician: 


[facility], MD








Hospitalization


Condition: Stable


Hospital course: 





28-year-old -American male patient with significant history of HIV low 

CD4 count was admitted with fever and chills


 and testicular pain, workup consistent with left epididymitis. 


-He was co-managed by infectious disease and Urology. He received IV 

antibiotics, scrotal elevation was recommended. The patient is being discharged 

 on a course of oral antibiotics which has been advised that he must complete.








Diagnoses


Left epididymitis


Sepsis


HIV/AIDS





Disposition: DC-01 TO HOME OR SELFCARE


Time spent for discharge: 33 mins





Core Measure Documentation





- Palliative Care


Palliative Care/ Comfort Measures: Not Applicable





- Core Measures


Any of the following diagnoses?: none





Exam





- Constitutional


Vitals: 


                                        











Temp Pulse Resp BP Pulse Ox


 


 98.1 F   78   16   128/78   98 


 


 02/26/19 11:39  02/26/19 11:39  02/26/19 11:39  02/26/19 11:39  02/26/19 11:39











General appearance: Present: no acute distress, well-nourished





- EENT


Eyes: Present: PERRL


ENT: hearing intact, clear oral mucosa





- Neck


Neck: Present: supple, normal ROM





- Respiratory


Respiratory effort: normal


Respiratory: bilateral: CTA





- Cardiovascular


Heart Sounds: Present: S1 & S2.  Absent: rub, click





- Extremities


Extremities: pulses symmetrical, No edema


Peripheral Pulses: within normal limits





- Abdominal


General gastrointestinal: Present: soft, non-tender, non-distended, normal bowel

 sounds


Male genitourinary: Present: normal





- Integumentary


Integumentary: Present: clear, warm, dry





- Musculoskeletal


Musculoskeletal: gait normal, strength equal bilaterally





- Psychiatric


Psychiatric: appropriate mood/affect, intact judgment & insight





- Neurologic


Neurologic: CNII-XII intact, moves all extremities





Plan


Follow up with: 


PRIMARY CARE,MD [Referring] - 3-5 Days


Forms:  STI Treatment and Prevention


Prescriptions: 


RX: Doxycycline [Vibramycin CAP] 100 mg PO Q12HR #6 capsule


RX: levoFLOXacin [Levaquin TAB] 750 mg PO Q24HR #5 tablet


oxyCODONE /ACETAMINOPHEN [Percocet 5/325] 1 tab PO Q6HR PRN #20 tablet


 PRN Reason: Pain

## 2019-02-26 NOTE — PROGRESS NOTE
Assessment and Plan





Cultures:


02/18/2019 blood culture: No growth


02/18/2019 urine culture: No growth





A/P:


28-year-old male with HIV:





#1 Left epididymitis: Improving. . Does report a history of chlamydia and 

gonorrhea in the past but has not been sexually active in the last 1 year or so.

We will increase ceftriaxone dose to 2 g every 24 hours and also add 

doxycycline. Discontinue levofloxacin. Given worsening in spite of antibiotics, 

we repeat a stat left testicular ultrasound  Per Dr. Najera, testicular US findi

ngs are due to increased inflammation, no role for surgery at this time.





          Repeat testicular US -  Interval change in left testicular blood flow 

pattern with high resistance flow now suspected, possibly secondary to worsening

 inflammation/epididymitis.  Small complex left hydrocele and left  scrotal 

edema. 


         -RPR - reactive (treated January, 2019)





#2 HIV: Reportedly well controlled. On Descovy, Prezcobix and Tivicay, taking 

his own supply.





#3 Fevers: No fever > 24 hours. discontinue ceftriaxone today.  Continue  

doxycycline and Levofloxacin. 





Recs:


 Continue increased dose of  Ceftriaxone, 2 gm daily, D7 of D7, last dose today 


Continue Doxycycline, D7of D10


Continue  Levofloxacin 750mg PO every 24 hours, D5 of D10


Follow up ID clinic in 2 weeks`





ID is signing off








Letha Jarrod, NP


[facility] 


M: [id number]


O:[phone]





Subjective


Date of service: 02/26/19


Interval history: 





Patient seen and examined.  Stated that his testicular pain and swelling is 

improved.  Discussion regarding follow up with urologist after discharge, 

verbalized understanding.  





Objective





- Exam


Narrative Exam: 





Constitutional: Alert, cooperative. Testicular pain improving


Head, Ears, Nose: Normocephalic, atraumatic. External ears, nose normal


Eyes: Conjunctivae/corneas clear. No icterus. No ptosis.


Neck: Supple, no meningeal signs


Oral: dentition fair, no thrush


Cardiovascular: S1, S2 normal. 


Respiratory: Good air entry, clear to auscultation bilaterally


GI: Soft, non-tender; bowel sounds normal. No peritoneal signs


: left testicle with significant swelling, firm and tender- improving.  No 

penile lesions


Musculoskeletal: No pedal edema, no cyanosis.


Skin: No rash or abscess


Hem/Lymphatic: No palpable cervical or supraclavicular nodes. No lymphangitis


Psych: Mood ok. Affect normal


Neurological: Awake, alert, oriented. No gross abnormality








- Constitutional


Vitals: 


                                   Vital Signs











Temp Pulse Resp BP Pulse Ox


 


 99.5 F   68   18   124/62   98 


 


 02/26/19 04:20  02/26/19 04:20  02/26/19 09:00  02/26/19 04:20  02/26/19 04:20








                           Temperature -Last 24 Hours











Temperature                    99.5 F


 


Temperature                    98.9 F


 


Temperature                    99.6 F


 


Temperature                    99.8 F

















- Labs


CBC & Chem 7: 


                                 02/25/19 10:13





                                 02/22/19 06:50


Labs: 


                              Abnormal lab results











  02/25/19 02/26/19 Range/Units





  10:13 07:00 


 


Hct  34.8 L   (35.5-45.6)  %


 


MCHC  35 H   (32-34)  %


 


RDW  13.1 L   (13.2-15.2)  %


 


Mono % (Auto)  10.6 H   (0.0-7.3)  %


 


Vancomycin Trough   30.7 H  (5.0-20.0)  ug/mL

## 2024-06-12 ENCOUNTER — OFFICE VISIT (OUTPATIENT)
Dept: URBAN - METROPOLITAN AREA CLINIC 118 | Facility: CLINIC | Age: 34
End: 2024-06-12

## 2024-09-06 ENCOUNTER — OFFICE VISIT (OUTPATIENT)
Dept: URBAN - METROPOLITAN AREA CLINIC 118 | Facility: CLINIC | Age: 34
End: 2024-09-06